# Patient Record
Sex: FEMALE | Race: WHITE | Employment: UNEMPLOYED | URBAN - METROPOLITAN AREA
[De-identification: names, ages, dates, MRNs, and addresses within clinical notes are randomized per-mention and may not be internally consistent; named-entity substitution may affect disease eponyms.]

---

## 2018-07-26 ENCOUNTER — LAB REQUISITION (OUTPATIENT)
Dept: LAB | Facility: HOSPITAL | Age: 33
End: 2018-07-26
Payer: COMMERCIAL

## 2018-07-26 DIAGNOSIS — Z34.83 ENCOUNTER FOR SUPERVISION OF OTHER NORMAL PREGNANCY, THIRD TRIMESTER: ICD-10-CM

## 2018-07-26 PROCEDURE — 87653 STREP B DNA AMP PROBE: CPT | Performed by: OBSTETRICS & GYNECOLOGY

## 2018-07-28 LAB — GP B STREP DNA SPEC QL NAA+PROBE: NORMAL

## 2018-07-31 ENCOUNTER — HOSPITAL ENCOUNTER (OUTPATIENT)
Facility: HOSPITAL | Age: 33
Discharge: HOME | End: 2018-07-31
Attending: OBSTETRICS & GYNECOLOGY | Admitting: OBSTETRICS & GYNECOLOGY
Payer: COMMERCIAL

## 2018-07-31 VITALS — TEMPERATURE: 97.8 F | RESPIRATION RATE: 16 BRPM

## 2018-07-31 PROBLEM — IMO0001 CEPHALIC VERSION: Status: ACTIVE | Noted: 2018-07-31

## 2018-07-31 PROCEDURE — 99203 OFFICE O/P NEW LOW 30 MIN: CPT

## 2018-07-31 RX ORDER — TERBUTALINE SULFATE 1 MG/ML
0.25 INJECTION, SOLUTION SUBCUTANEOUS ONCE
Status: COMPLETED | OUTPATIENT
Start: 2018-07-31 | End: 2018-07-31

## 2018-07-31 RX ADMIN — TERBUTALINE SULFATE 0.25 MG: 1 INJECTION SUBCUTANEOUS at 19:08

## 2018-07-31 NOTE — PROCEDURES
ewa Infante at 37w4d with an YORDAN of 8/17/2018, by Patient Reported, was seen at [facility] for the following procedure(s):       External Cephalic version    Patient was consented for an external cephalic version  IV access was obtained  Ultrasound done at bedside confirmed breech presentation with adequate fluid pockets  NST was Cat 1  Anesthesia and NICU were aware of the plan  Assistant- Dr Hussain Gatica     External cephalic version was performed under ultrasound guidance  Successful version was done in approximately 2 minutes  Fetus was placed on external moniotring for approximately one hour after procedure  Fetal tracing Cat 1 with no decels or contractions  Patient was discharged to home with routine discharge instructions and was to follow up with OB as scheduled

## 2018-07-31 NOTE — H&P
H&P Exam - Obstetrics   Yoel Olivas 35 y o  female MRN: [id number]  Unit/Bed#: L&D 323-01 Encounter: [id number]      History of Present Illness     Chief Complaint: For external cephalic version    HPI:  oYel Olivas is a 35 y o  [place] female with an YORDAN of Not found  at Unknown weeks gestation who is being admitted for ***  Contractions: ***  Vaginal Bleeding:***  Loss of Fluid:***  Fetal Movement:***    PREGNANCY COMPLICATIONS:   None    OB History    Para Term  AB Living   2 1 1 0 0 1   SAB TAB Ectopic Multiple Live Births           1      # Outcome Date GA Lbr Jeff/2nd Weight Sex Delivery Anes PTL Lv   2 Current            1 Term 07/19/15 38w2d    Vag-Vacuum   AARON          Baby complications/comments:  None    Review of Systems    Historical Information   No past medical history on file  No past surgical history on file  Social History   History   Alcohol use Not on file     History   Drug use: Unknown     History   Smoking Status    Not on file   Smokeless Tobacco    Not on file     Family History: No family history on file  Meds/Allergies    {  No prescriptions prior to admission  Allergies not on file    OBJECTIVE:    Vitals: There were no vitals taken for this visit  There is no height or weight on file to calculate BMI  Physical Exam  SVE:      FHT:     TOCO:          Prenatal Labs:   Blood type: AB positive  Antigen Screen: negative  Rubella: Immune  HIV: Negative  RPR: NR  Hep B: negative  Diabetes Screen: 112  GBS: negative      Assessment/Plan     ASSESSMENT:  36 yo  with breech presentation for external cephalic version    PLAN:   1) eFHM and toco   2) Insert peripheral IV   3) Terbutaline 0 25 mcg once          This patient will be an INPATIENT  and I certify the anticipated length of stay is >2 Midnights        Oli Herbert MD  2018  5:32 PM

## 2018-08-19 ENCOUNTER — HOSPITAL ENCOUNTER (INPATIENT)
Facility: HOSPITAL | Age: 33
LOS: 1 days | Discharge: HOME | End: 2018-08-20
Attending: OBSTETRICS & GYNECOLOGY | Admitting: OBSTETRICS & GYNECOLOGY
Payer: COMMERCIAL

## 2018-08-19 PROBLEM — Z87.59 STATUS POST VACUUM-ASSISTED VAGINAL DELIVERY: Status: ACTIVE | Noted: 2018-08-19

## 2018-08-19 PROBLEM — Z3A.40 40 WEEKS GESTATION OF PREGNANCY: Status: ACTIVE | Noted: 2018-08-19

## 2018-08-19 LAB
BASE EXCESS BLDCOA CALC-SCNC: -8.5 MMOL/L (ref 3–11)
BASE EXCESS BLDCOV CALC-SCNC: -5.7 MMOL/L (ref 1–9)
HCO3 BLDCOA-SCNC: 22.1 MMOL/L (ref 17.3–27.3)
HCO3 BLDCOV-SCNC: 20.3 MMOL/L (ref 12.2–28.6)
O2 CT VFR BLDCOA CALC: 10.4 ML/DL
OXYHGB MFR BLDCOA: 41.9 %
OXYHGB MFR BLDCOV: 64.2 %
PCO2 BLDCOA: 66.4 MM[HG] (ref 30–60)
PCO2 BLDCOV: 41.7 MM HG (ref 27–43)
PH BLDCOA: 7.14 [PH] (ref 7.23–7.43)
PH BLDCOV: 7.31 [PH] (ref 7.19–7.49)
PO2 BLDCOA: 24.1 MM HG (ref 5–25)
PO2 BLDCOV: 28.6 MM HG (ref 15–45)
SAO2 % BLDCOV: 16.2 ML/DL

## 2018-08-19 PROCEDURE — 99201 HB OFFICE/OUTPATIENT VISIT NEW (BRIEF): CPT

## 2018-08-19 PROCEDURE — 4A1HXCZ MONITORING OF PRODUCTS OF CONCEPTION, CARDIAC RATE, EXTERNAL APPROACH: ICD-10-PCS | Performed by: OBSTETRICS & GYNECOLOGY

## 2018-08-19 PROCEDURE — 10907ZC DRAINAGE OF AMNIOTIC FLUID, THERAPEUTIC FROM PRODUCTS OF CONCEPTION, VIA NATURAL OR ARTIFICIAL OPENING: ICD-10-PCS | Performed by: OBSTETRICS & GYNECOLOGY

## 2018-08-19 PROCEDURE — 82805 BLOOD GASES W/O2 SATURATION: CPT | Performed by: OBSTETRICS & GYNECOLOGY

## 2018-08-19 RX ORDER — SIMETHICONE 80 MG
80 TABLET,CHEWABLE ORAL 4 TIMES DAILY PRN
Status: DISCONTINUED | OUTPATIENT
Start: 2018-08-19 | End: 2018-08-20 | Stop reason: HOSPADM

## 2018-08-19 RX ORDER — ACETAMINOPHEN 325 MG/1
650 TABLET ORAL EVERY 4 HOURS PRN
Status: DISCONTINUED | OUTPATIENT
Start: 2018-08-19 | End: 2018-08-20 | Stop reason: HOSPADM

## 2018-08-19 RX ORDER — DIAPER,BRIEF,INFANT-TODD,DISP
1 EACH MISCELLANEOUS AS NEEDED
Status: DISCONTINUED | OUTPATIENT
Start: 2018-08-19 | End: 2018-08-20 | Stop reason: HOSPADM

## 2018-08-19 RX ORDER — IBUPROFEN 600 MG/1
600 TABLET ORAL EVERY 6 HOURS PRN
Status: DISCONTINUED | OUTPATIENT
Start: 2018-08-19 | End: 2018-08-20 | Stop reason: HOSPADM

## 2018-08-19 RX ORDER — OXYTOCIN/RINGER'S LACTATE 30/500 ML
250 PLASTIC BAG, INJECTION (ML) INTRAVENOUS CONTINUOUS
Status: ACTIVE | OUTPATIENT
Start: 2018-08-19 | End: 2018-08-19

## 2018-08-19 RX ORDER — ONDANSETRON 2 MG/ML
4 INJECTION INTRAMUSCULAR; INTRAVENOUS EVERY 8 HOURS PRN
Status: DISCONTINUED | OUTPATIENT
Start: 2018-08-19 | End: 2018-08-20 | Stop reason: HOSPADM

## 2018-08-19 RX ORDER — DOCUSATE SODIUM 100 MG/1
100 CAPSULE, LIQUID FILLED ORAL 2 TIMES DAILY
Status: DISCONTINUED | OUTPATIENT
Start: 2018-08-19 | End: 2018-08-20 | Stop reason: HOSPADM

## 2018-08-19 RX ORDER — OXYCODONE HYDROCHLORIDE AND ACETAMINOPHEN 5; 325 MG/1; MG/1
2 TABLET ORAL EVERY 4 HOURS PRN
Status: DISCONTINUED | OUTPATIENT
Start: 2018-08-19 | End: 2018-08-20 | Stop reason: HOSPADM

## 2018-08-19 RX ORDER — OXYTOCIN 10 [USP'U]/ML
INJECTION, SOLUTION INTRAMUSCULAR; INTRAVENOUS
Status: DISPENSED
Start: 2018-08-19 | End: 2018-08-19

## 2018-08-19 RX ORDER — OXYCODONE HYDROCHLORIDE AND ACETAMINOPHEN 5; 325 MG/1; MG/1
1 TABLET ORAL EVERY 4 HOURS PRN
Status: DISCONTINUED | OUTPATIENT
Start: 2018-08-19 | End: 2018-08-20 | Stop reason: HOSPADM

## 2018-08-19 RX ORDER — DIPHENHYDRAMINE HCL 25 MG
25 TABLET ORAL EVERY 6 HOURS PRN
Status: DISCONTINUED | OUTPATIENT
Start: 2018-08-19 | End: 2018-08-20 | Stop reason: HOSPADM

## 2018-08-19 RX ADMIN — BENZOCAINE AND LEVOMENTHOL 1 APPLICATION: 200; 5 SPRAY TOPICAL at 04:17

## 2018-08-19 RX ADMIN — IBUPROFEN 600 MG: 600 TABLET, FILM COATED ORAL at 17:38

## 2018-08-19 RX ADMIN — DOCUSATE SODIUM 100 MG: 100 CAPSULE, LIQUID FILLED ORAL at 09:36

## 2018-08-19 RX ADMIN — IBUPROFEN 600 MG: 600 TABLET, FILM COATED ORAL at 04:45

## 2018-08-19 RX ADMIN — WITCH HAZEL 1 PAD: 500 SOLUTION RECTAL; TOPICAL at 04:17

## 2018-08-19 NOTE — DISCHARGE SUMMARY
Discharge Summary - Francisco J Jiménez 35 y o  female MRN: [id number]    Unit/Bed#: L&D 418-09 Encounter: [id number]    Admission Date: 2018     Discharge Date: 18    Attending: Gabriela Vale DO    Principal Diagnosis: Encounter for full-term uncomplicated delivery [S03], Pregnancy at 40w2d    Secondary Diagnosis: non-reassuring fetal status    Procedures: vacuum, low    Anesthesia: none    Complications: none apparent    Condition at discharge: stable     History of Hospital Course:  Corin Montoya was a 34 yo  at 36 2/7 wksWho presented to Labor and delivery in active labor  Upon arrival to the unit she was checked and noted to be complete/+1  She underwent amniotomy for meconium fluid and started pushing  There was good fetal descent with pushing however the baby started having late decels with slow return from the [de-identified]  Patient was given the option to continue pushing for a short amount of time however she could not accomplish delivery during that window  Vacuum assisted delivery was recommended  Risks and benefits were discussed  She was agreeable and underwent vacuum assisted vaginal delivery for a viable male  She had only a small left labial laceration which was hemostatic without repair  Her postpartum course was unremarkable  She was ambulating well tolerating a full diet voiding without difficulty and bonding well with her baby prior to discharge  She was discharged to home  Discharge instructions/Information to patient and family:   See after visit summary for information provided to patient and family  Provisions for Follow-Up Care:  See after visit summary for information related to follow-up care and any pertinent home health orders  Disposition: See After Visit Summary for discharge disposition information  Planned Readmission: No    Discharge Medications: For a complete list of the patient's medications, please refer to her med rec

## 2018-08-19 NOTE — H&P
H&P Exam - Obstetrics   Fadumo Patel 35 y o  female MRN: [id number]  Unit/Bed#: L&D 326-01 Encounter: [id number]    Assessment/Plan     Assessment:  30yo  at 40 2wks s/p vacuum assisted vaginal delivery  Currently in stable condition  Hospital day 1  Plan:  Admit for postpartum care  Routine perineal and postpartum care  Labs: CBC  Monitor lochia  Anticipate discharge on PPD #2  History of Present Illness   Chief Complaint: Active labor    HPI:  Fadumo Patel is a 35 y o  , now  female with an YORDAN of 2018, by Patient Reported at 40w2d weeks gestation who is being admitted for Active labor  Patient's prenatal course was complicated by malposition of fetus  Patient underwent an external cephalic version on   Patient reported contractions that started at approximately 2200  She denied LOF and vaginal bleeding  She then presented to L&D completely dilated and with urge to push  Patient's membranes were artificially ruptured  A vacuum assisted vaginal delivery was performed due to suspicion of fetal compromise  She successfully delivered a viable male  at 02:21  Pregnancy complications: none  Review of Systems   Constitutional: Negative  Negative for chills, diaphoresis and fever  HENT: Negative  Eyes: Negative for visual disturbance  Respiratory: Negative  Negative for chest tightness, shortness of breath and wheezing  Cardiovascular: Negative  Negative for chest pain and palpitations  Gastrointestinal: Positive for abdominal pain  Negative for constipation, nausea and vomiting  Genitourinary: Positive for pelvic pain  Negative for difficulty urinating, dysuria, vaginal bleeding, vaginal discharge and vaginal pain  Musculoskeletal: Negative  Skin: Negative  Neurological: Negative  Negative for dizziness, seizures, weakness and headaches  Hematological: Negative  Psychiatric/Behavioral: Negative          Historical Information   OB History    Para Term  AB Living   2 1 1 0 0 1   SAB TAB Ectopic Multiple Live Births           1      # Outcome Date GA Lbr Jeff/2nd Weight Sex Delivery Anes PTL Lv   2 Current            1 Term 07/19/15 38w2d    Vag-Vacuum   AARON        Baby complications/comments: none  No past medical history on file  No past surgical history on file  Social History   History   Alcohol use Not on file     History   Drug use: Unknown     History   Smoking Status    Not on file   Smokeless Tobacco    Not on file     Family History: No family history on file  Meds/Allergies   all medications and allergies reviewed  Allergies   Allergen Reactions    No Active Allergies        Objective   Vitals: Blood pressure 116/82, pulse 86, temperature 98 °F (36 7 °C), temperature source Oral, resp  rate 16  There is no height or weight on file to calculate BMI  Invasive Devices          No matching active lines, drains, or airways          Physical Exam   Constitutional: She is oriented to person, place, and time  She appears well-developed and well-nourished  No distress  HENT:   Head: Normocephalic and atraumatic  Cardiovascular: Normal rate and intact distal pulses  Pulmonary/Chest: Effort normal and breath sounds normal  No respiratory distress  She exhibits no tenderness  Abdominal: Soft  There is no tenderness  There is no rebound and no guarding  Genitourinary: Vagina normal  No vaginal discharge found  Genitourinary Comments: SVE 10/100/+1, fetus in vertex position  Musculoskeletal: Normal range of motion  Neurological: She is alert and oriented to person, place, and time  Skin: Skin is warm and dry  She is not diaphoretic  Psychiatric: She has a normal mood and affect   Her behavior is normal  Judgment and thought content normal        Prenatal Labs:   Blood Type:   Lab Results   Component Value Date/Time    ABO Grouping AB 2015 11:00 PM     , D (Rh type):   Lab Results Component Value Date/Time    Rh Factor Positive 07/18/2015 11:00 PM     , Antibody Screen:   Lab Results   Component Value Date/Time    Antibody Screen Negative 07/18/2015 11:00 PM    , HCT/HGB:   Lab Results   Component Value Date/Time    Hematocrit 35 3 07/18/2015 11:00 PM    Hemoglobin 11 5 07/18/2015 11:00 PM      , Platelets:   Lab Results   Component Value Date/Time    Platelets 228 96/04/8468 11:00 PM      , 1 hour Glucola: No results found for: [id number], Rubella: No results found for: RUBELLAIGGQT     , VDRL/RPR:   Lab Results   Component Value Date/Time    RPR SCREEN Nonreactive 07/18/2015 11:00 PM      , Hep B: No results found for: HEPBSAG, EXTHEPBSAG  , HIV: No results found for: HIVAGAB  , Group B Strep:    Lab Results   Component Value Date/Time    Strep Grp B PCR Negative for Beta Hemolytic Strep Grp B by PCR 07/26/2018 05:27 PM          Imaging, EKG, Pathology, and Other Studies: I have personally reviewed pertinent reports  Diana Pool MD  OBGYN, [facility]  8/19/2018 3:25 AM

## 2018-08-19 NOTE — L&D DELIVERY NOTE
DELIVERY NOTE  Carla Danielson 35 y o  female MRN: [id number]  Unit/Bed#: L&D 315-01 Encounter: [id number]    Obstetrician: Cj Gill DO  Assistant: Alejandro Daavlos MD    Pre-Delivery Diagnosis: Term pregnancy  Single fetus  Pregnancy complicated by previous breech presentation resolved by ECV    Post-Delivery Diagnosis: Same as above - Delivered  Viable male fetus  Labial tear    Procedure: Low vacuum assisted vaginal delivery    Delivery Date and Time: 18 at [address]    Indications for instrumental delivery: Suspicion of immediate or potential fetal compromise    Estimated Blood Loss:  216LB           Complications:  None    Labor and Delivery  Carla Danielson arrived to &D in active labor  She was examined upon arrival to [facility] and her cervical exam was Complete and +1  She began pushing immediately after AROM for meconium stained fluid  During pushing, the baby had recurrent decels to the 80s for which a VAVD was recommended  Risks and benefits were discussed with the patient and she was agreeable  Kiwi vacuum was applied at 66 91 28 to [facility] position and removed at 0221  After pushing for 22 minutes under epidural anesthesia, with no vacuum popoffs, the patient delivered by VAVD  There was no nuchal cord  The anterior shoulder delivered atraumatically with maternal expulsive forces and the assistance of gentle downward traction  The posterior shoulder delivered with maternal expulsive forces and the assistance of gentle upward traction  The remainder of the fetus delivered without difficulty  She gave birth to a viable male  with Apgar scores of 8 at 1 minute and 9 at 5 minutes  The baby weighed 7lb  Magno Fuentes  Upon delivery, the  was placed on the mother's abdomen and the umbilical cord was eventually clamped and cut  The  resuscitator evaluated the  at bedside   Arterial and venous cord blood gases and cord blood were collected for analysis along with a segment of the umbilical cord  The placenta delivered spontaneously and was noted to have a 3-vessel cord  The uterus was massaged to firm and IV Pitocin was given  EBL: 300mL    The vagina, cervix, and perineum were inspected and there was noted to be a left labial laceration that was hemostatic without repair  At the conclusion of the delivery, all sponge, needle, and instrument counts were noted to be correct  The patient tolerated the procedure well and was allowed to recover in labor and delivery room  I was present for this delivery      Janae Reese DO  08/19/18  8:04 AM

## 2018-08-19 NOTE — NURSING NOTE
Dr Brooks Ladarius at bedside to evaluate pt  AROM @ 0200,Pt Pushing, Meconium noted, Neonatolgy called  FHR 80-90, Vacuum applied  VAVD at 0221  FFU  Moderate bleeding  Both Mother and baby stable

## 2018-08-20 VITALS
RESPIRATION RATE: 18 BRPM | DIASTOLIC BLOOD PRESSURE: 82 MMHG | HEART RATE: 72 BPM | OXYGEN SATURATION: 97 % | SYSTOLIC BLOOD PRESSURE: 113 MMHG | TEMPERATURE: 97.6 F

## 2018-08-20 RX ADMIN — DOCUSATE SODIUM 100 MG: 100 CAPSULE, LIQUID FILLED ORAL at 08:11

## 2018-08-20 NOTE — PROGRESS NOTES
Postpartum Progress Note - OB/GYN   Jt Stephens 35 y o  female MRN: [id number]  Unit/Bed#: L&D 315-01 Encounter: [id number]    Postpartum Day: #1    Procedure: Vacuum Vaginal Delivery    Subjective ROS  Pain: mild cramping, improved with medications  Tolerating Oral Intake: yes   Voiding: yes  Flatus: yes  Bowel Movement: no  Ambulating: yes  Breastfeeding: yes  Chest Pain: no  Shortness of Breath: no  Leg Pain/Discomfort: no  Lochia: moderate    Vitals: /86 (BP Location: Left arm)   Pulse 84   Temp 98 2 °F (36 8 °C) (Oral)   Resp 16       Intake/Output Summary (Last 24 hours) at 18 9927  Last data filed at 18 1422   Gross per 24 hour   Intake                0 ml   Output             1300 ml   Net            -1300 ml       Physical Exam  General: appears well, NAD, alert and oriented x 3, pleasant and cooperative  Cardiovascular: Regular, Rate and Rhythm, no murmur, gallop or rub  Lungs: Clear to Auscultation Bilaterally, no wheezing, rhonchi or rales   Abdomen: Soft, non-distended, non-tender, no rebound  or guarding  Fundus: Firm & Non-Tender   Fundal Location: At the umbilicus  Lower Extremities: Symmetric, nontender    Labs:   Admission on 2018   Component Date Value    pH, Cord Art 2018 7 140*    pCO2, Cord Art 2018 66 4*    pO2, Cord Art 2018 24 1     HCO3, Cord Art 2018 22 1     Base Exc, Cord Art 2018 -8 5*    O2 Content, Cord Art 2018 10 4     O2 Hgb, Arterial Cord 2018 41 9     pH, Cord Jaison 2018 7  306     pCO2, Cord Jaison 2018 41 7     pO2, Cord Jaison 2018 28 6     HCO3, Cord Jaison 2018 20 3    [facility] Exc, Cord Jaison 2018 -5 7*    O2 Cont, Cord Jaison 2018 16 2     O2 HGB,VENOUS CORD 2018 64 2        Assessment:  35 y o   s/p VAVD PPD#1, doing well postpartum       Plan:  Continue routine care  Pain medication as needed  Encourage breastfeeding  Encourage ambulation  Encourage PO intake  Anticipate discharge tomorrow, PPD#2    Karla Davis MD  8/20/2018  6:15 AM

## 2018-08-20 NOTE — DISCHARGE SUMMARY
Discharge Summary - Agnes Kelly 35 y o  female MRN: [id number]    Unit/Bed#: L&D 497-61 Encounter: [id number]    Admission Date: 8/19/2018     Discharge Date: 8/20/2018    Attending: Trupti Crawley DO    Principal Diagnosis: Encounter for full-term uncomplicated delivery [R76]    Secondary Diagnosis: S/P VAVD    Procedures: VAVD on 3/49/0130    Complications: none apparent    Hospital Course: She presented in active labor and underwent an uncomplicated VAVD secondary to non-reassuring fetal condition  She had an uncomplicated postpartum course  She was breastfeeding without difficulty  On day of discharge (PPD#1) pain was well controlled, patient was tolerating PO, passing flatus  She was discharged with standard post partum instructions to follow up with her physician in 3-6 weeks for a postpartum appointment  Condition at discharge: good     Discharge instructions/Information to patient and family:   See after visit summary for information provided to patient and family  Provisions for Follow-Up Care:  See after visit summary for information related to follow-up care and any pertinent home health orders  Disposition: See After Visit Summary for discharge disposition information  Planned Readmission: No    Discharge Medications: For a complete list of the patient's medications, please refer to her med rec

## 2018-08-31 ENCOUNTER — TELEPHONE (OUTPATIENT)
Dept: POSTPARTUM | Facility: CLINIC | Age: 33
End: 2018-08-31

## 2018-08-31 NOTE — TELEPHONE ENCOUNTER
Plugged Ducts During Lactation Octavio Fischer is calling because she is nursing and believes she has a plugged duct  She is also being treated for mastitis by her OB  She has had fever since this morning and has an area on breast that is tender and feels blocked  She is 2 weeks postpartum   Any fever? Yes  Any breast redness over the plugged area? No   Is there severe pain that necessitates prescription pain medication? No   If any answers are yes to the above, she needs to see her primary care provider or other medical provider ASAP  Has the plugged area been present for more than 48 hours? No   If yes, she needs an urgent visit to rule out infection, abscess or other type of mass  If she has a swollen painful region of the breast that is not draining, and it has been less than 48 hours, advise the following:   x Use heat on the breast for 5-10 minutes before nursing  This can be done with a heating pad, moist warm towels, or hand warmers  x Nurse the baby on that side more often, at least every 2 hours  IF not nursing, pump that side every 2 hours  Changing breastfeeding positions can also help work out a plug  x Try to massage the plugged area while nursing or pumping to help drain the area  x Use ibuprofen 400mg every 4 hours or acetaminophen 650mg every 4 hours as needed for discomfort  x If the area is not drained well by 48 hours, she should be seen by her primary care provider and ideally a lactation consultant  Take medications as prescribed by her OB  Call with any additional questions or concerns

## 2018-08-31 NOTE — TELEPHONE ENCOUNTER
Pt called - thinks has mastitis - pain in right breast on outside - not red yet - she also has a cold - spiked a fever - not sure if connected - said has a call out to [facility] doc which is Dr Sam Hedrick (hasn't heard back yet) - did not call her OB - please advise

## 2018-09-05 LAB — PLACENTA IN STORAGE: NORMAL

## 2022-03-16 ENCOUNTER — OFFICE VISIT (OUTPATIENT)
Dept: FAMILY MEDICINE CLINIC | Facility: CLINIC | Age: 37
End: 2022-03-16
Payer: COMMERCIAL

## 2022-03-16 VITALS
BODY MASS INDEX: 23.63 KG/M2 | HEART RATE: 104 BPM | DIASTOLIC BLOOD PRESSURE: 86 MMHG | HEIGHT: 66 IN | OXYGEN SATURATION: 100 % | WEIGHT: 147 LBS | SYSTOLIC BLOOD PRESSURE: 132 MMHG | TEMPERATURE: 98.6 F

## 2022-03-16 DIAGNOSIS — Z11.59 ENCOUNTER FOR HEPATITIS C SCREENING TEST FOR LOW RISK PATIENT: ICD-10-CM

## 2022-03-16 DIAGNOSIS — Z13.220 SCREENING FOR LIPID DISORDERS: ICD-10-CM

## 2022-03-16 DIAGNOSIS — E04.1 LEFT THYROID NODULE: ICD-10-CM

## 2022-03-16 DIAGNOSIS — Z11.4 SCREENING FOR HIV (HUMAN IMMUNODEFICIENCY VIRUS): ICD-10-CM

## 2022-03-16 DIAGNOSIS — E55.9 VITAMIN D DEFICIENCY: ICD-10-CM

## 2022-03-16 DIAGNOSIS — Z00.00 ANNUAL PHYSICAL EXAM: Primary | ICD-10-CM

## 2022-03-16 PROCEDURE — 1036F TOBACCO NON-USER: CPT | Performed by: FAMILY MEDICINE

## 2022-03-16 PROCEDURE — 99385 PREV VISIT NEW AGE 18-39: CPT | Performed by: FAMILY MEDICINE

## 2022-03-16 PROCEDURE — 3725F SCREEN DEPRESSION PERFORMED: CPT | Performed by: FAMILY MEDICINE

## 2022-03-16 PROCEDURE — 3008F BODY MASS INDEX DOCD: CPT | Performed by: FAMILY MEDICINE

## 2022-03-16 NOTE — PROGRESS NOTES
Patient Name:  Thony Hui   :  1985   MRN:  [id number]   CSN:  [id number]     Subjective:     REASON FOR VISIT:    Chief Complaint   Patient presents with    Establish Care        HISTORY OF PRESENT ILLNESS:     Antoinette Giordano is a 39 y o  female who presents today for annual physical      Date of last physical exam: n/a   Most recent bloodwork: n/a     Flu shot:  Patient received counseling today and was educated on importance of flu vaccine  Patient aware of risks of receiving vaccine such as allergy, GBS, and mild illness  Preventive: The patient's BMI is Body mass index is 23 55 kg/m²  Jose Rafael Knapp The BMI is in the acceptable range   Diet: Well rounded, home cooked    part-time, works from home    Exercise:  Pilates, yoga and cardio 5 days/week   Colonoscopy (>50, African [place] >[address] old): denies    Last Dental Visit: every 6 months, [facility]     OB/GYn: [facility]   Pregnancy History:    P: 2  Age 10and 1year old boys    Menstrual History   Mammography/breast ultrasound: denies   Denies family history of reproductive cancers   Last Pap: 2018  History of Abnormal Pap:denies     LMP:  22  Regular cycles  Sexually active: yes with    Uses STD/pregnancy protection: condoms   History of STD: denies     PAST MEDICAL HISTORY:   Past Medical History:   Diagnosis Date    Allergic     Anxiety         PAST SURGICAL HISTORY:   History reviewed  No pertinent surgical history       CURRENT MEDICATIONS:   Previous Medications    PRENATAL VIT-FE FUMARATE-FA (PRENATAL VITAMIN PO)    Take 1 tablet by mouth daily        SOCIAL HISTORY:   Social History     Tobacco Use    Smoking status: Never Smoker    Smokeless tobacco: Never Used   Substance Use Topics    Alcohol use: Yes     Comment: social        DEPRESSION SCREENING:   Depression Screening   PHQ-2/9 Depression Screening    Little interest or pleasure in doing things: 0 - not at all  Feeling down, depressed, or hopeless: 0 - not at all  PHQ-2 Score: 0  PHQ-2 Interpretation: Negative depression screen                                                                     FAMILY HISTORY:   Family History   Problem Relation Age of Onset    Thyroid cancer Mother     Diabetes Father     Asthma Brother     No Known Problems Maternal Grandmother     Heart disease Maternal Grandfather         ALLERGIES:   Allergies   Allergen Reactions    No Active Allergies         ROS:   Review of Systems   Constitutional: Negative for activity change, appetite change, chills, fatigue and fever  HENT: Negative for congestion, ear discharge, ear pain, postnasal drip, rhinorrhea, sinus pressure, sinus pain, sneezing, sore throat and trouble swallowing  Eyes: Negative for pain, discharge, redness, itching and visual disturbance  Respiratory: Negative for apnea, cough, chest tightness, shortness of breath and wheezing  Cardiovascular: Negative for chest pain, palpitations and leg swelling  Gastrointestinal: Negative for abdominal distention, abdominal pain, blood in stool, constipation, diarrhea, nausea and vomiting  Endocrine: Negative for polyuria  Genitourinary: Negative for decreased urine volume, difficulty urinating, dyspareunia, dysuria, flank pain, frequency, menstrual problem, pelvic pain, urgency, vaginal bleeding and vaginal discharge  Musculoskeletal: Negative for arthralgias, gait problem, joint swelling and myalgias  Skin: Negative for rash  Neurological: Negative for dizziness, weakness, light-headedness, numbness and headaches  Psychiatric/Behavioral: Negative for behavioral problems and dysphoric mood  The patient is not nervous/anxious  All other systems reviewed and are negative         Objective:     PHYSICAL EXAM:   /86 (BP Location: Left arm, Patient Position: Sitting, Cuff Size: Standard)   Pulse 104   Temp 98 6 °F (37 °C) (Tympanic)   Ht 5' 6 25" (1 683 m)   Wt 66 7 kg (147 lb)   LMP 02/27/2022 (Exact Date)   SpO2 100%   Breastfeeding Yes   BMI 23 55 kg/m²    No exam data present   Physical Exam  Vitals and nursing note reviewed  Constitutional:       General: She is not in acute distress  Appearance: Normal appearance  HENT:      Head: Normocephalic and atraumatic  Right Ear: Tympanic membrane, ear canal and external ear normal       Left Ear: Tympanic membrane, ear canal and external ear normal       Nose: Nose normal  No congestion  Mouth/Throat:      Mouth: Mucous membranes are moist       Pharynx: Oropharynx is clear  Eyes:      Extraocular Movements: Extraocular movements intact  Conjunctiva/sclera: Conjunctivae normal       Pupils: Pupils are equal, round, and reactive to light  Neck:      Thyroid: Thyroid mass present  Vascular: No carotid bruit  Cardiovascular:      Rate and Rhythm: Normal rate and regular rhythm  Heart sounds: No murmur heard  Pulmonary:      Effort: Pulmonary effort is normal  No respiratory distress  Breath sounds: Normal breath sounds  Abdominal:      General: Bowel sounds are normal  There is no distension  Palpations: Abdomen is soft  There is no mass  Musculoskeletal:         General: No swelling  Normal range of motion  Cervical back: Normal range of motion  Lymphadenopathy:      Cervical: No cervical adenopathy  Skin:     General: Skin is warm and dry  Neurological:      General: No focal deficit present  Mental Status: She is alert and oriented to person, place, and time  Psychiatric:         Mood and Affect: Mood normal          Behavior: Behavior normal          Thought Content:  Thought content normal           Assessment/Plan:   Problem List Items Addressed This Visit     None      Visit Diagnoses     Annual physical exam    -  Primary    Relevant Orders    CBC and differential    Comprehensive metabolic panel    Lipid Panel with Direct LDL reflex    TSH, 3rd generation Screening for HIV (human immunodeficiency virus)        Relevant Orders    HIV 1/2 Antigen/Antibody (4th Generation) w Reflex [facility]    Encounter for hepatitis C screening test for low risk patient        Relevant Orders    Hepatitis C antibody    Screening for lipid disorders        Relevant Orders    Lipid Panel with Direct LDL reflex    Left thyroid nodule        Relevant Orders    US thyroid    Vitamin D deficiency        Relevant Orders    Vitamin D 25 hydroxy             Patient Counseling:   · Exercise: Stressed the importance of regular exercise       150 minutes of cardiovascular exercise encouraged weekly  · Nutrition: Stressed importance of moderation in sodium/caffeine intake, saturated fat and cholesterol, caloric balance, sufficient intake of fresh fruits, vegetables, fiber     Leticia Melendrez DO

## 2022-03-17 ENCOUNTER — TELEPHONE (OUTPATIENT)
Dept: ADMINISTRATIVE | Facility: OTHER | Age: 37
End: 2022-03-17

## 2022-03-17 NOTE — LETTER
Procedure Request Form: Cervical Cancer Screening      Date Requested: 22  Patient: Carlota Martin  Patient : 1985   Referring Provider: Pat Vasquez, DO        Date of Procedure ______________________________       The above patient has informed us that they have completed their   most recent Cervical Cancer Screening at your facility  Please complete   this form and attach all corresponding procedure reports/results  Comments __last we have is  ________________________________________________________  ____________________________________________________________________  ____________________________________________________________________  ____________________________________________________________________    Facility Completing Procedure _________________________________________    Form Completed By (print name) _______________________________________      Signature __________________________________________________________      These reports are needed for  compliance  Please fax this completed form and a copy of the procedure report to our office located at [place] [address] as soon as possible to [phone] yocasta Horton: Phone [phone]    We thank you for your assistance in treating our mutual patient

## 2022-03-17 NOTE — LETTER
Vaccination Request Form: Tdap      Date Requested: 22  Patient: Amey Sandifer  Patient : 1985   Referring Provider: Verner Deems,        The above patient has informed us that they have had their   most recent Tdap administered at your facility  Please   complete this form and attach all corresponding documentation  Date of Vaccine(s) Given  ______________________________    Lot Number(s) _______________________________________    Manufacture(s) ______________________________________    Dose Amount (s) _____________________________________    Expiration Date(s) ____________________________________    Comments __________________________________________________________  ____________________________________________________________________  ____________________________________________________________________  ____________________________________________________________________    Administering Facility  ________________________________________________    Vaccine Administered By (print name) ___________________________________      Form Completed By (print name) _______________________________________      Signature ___________________________________________________________      These reports are needed for  compliance  Please fax this completed form and a copy of the Vaccine Document(s) to our office located at [place] [address] as soon as possible to [phone] yocasta Jeerz: Phone [phone]    We thank you for your assistance in treating our mutual patient

## 2022-03-17 NOTE — TELEPHONE ENCOUNTER
----- Message from Starr Mcneil [place] sent at 3/16/2022  3:25 PM EDT -----  Regarding: Care Gap Request  03/16/22 3:25 PM    Hello, our patient Charley Santoro has had Immunization(s) - TDAP and Pap Smear (HPV) aka Cervical Cancer Screening completed/performed  Please assist in updating the patient chart by making an External outreach to [facility] located in [facility]  The date of service unknown      Thank you,  Selma Milian MA  [facility]

## 2022-03-17 NOTE — TELEPHONE ENCOUNTER
Upon review of the In Basket request and the patient's chart, initial outreach has been made via fax, please see Contacts section for details        Pap and tdap x1     Thank you  Jessica Hidalgo

## 2022-03-17 NOTE — LETTER
Procedure Request Form: Cervical Cancer Screening      Date Requested: 22  Patient: Phil Jett  Patient : 1985   Referring Provider: Saray Longest, DO        Date of Procedure ______________________________       The above patient has informed us that they have completed their   most recent Cervical Cancer Screening at your facility  Please complete   this form and attach all corresponding procedure reports/results  Comments _last we have is -13  ________________________________________________________  ____________________________________________________________________  ____________________________________________________________________  ____________________________________________________________________    Facility Completing Procedure _________________________________________    Form Completed By (print name) _______________________________________      Signature __________________________________________________________      These reports are needed for  compliance  Please fax this completed form and a copy of the procedure report to our office located at [place] [address] as soon as possible to [phone] yocasta Soni Man: Phone [phone]    We thank you for your assistance in treating our mutual patient

## 2022-03-24 NOTE — TELEPHONE ENCOUNTER
As a follow-up, a second attempt has been made for outreach via fax, please see Contacts section for details       2nd att - pap     Thank you  Pasha Dyson

## 2022-03-25 ENCOUNTER — APPOINTMENT (OUTPATIENT)
Dept: LAB | Facility: AMBULARY SURGERY CENTER | Age: 37
End: 2022-03-25
Payer: COMMERCIAL

## 2022-03-28 ENCOUNTER — HOSPITAL ENCOUNTER (OUTPATIENT)
Dept: ULTRASOUND IMAGING | Facility: MEDICAL CENTER | Age: 37
Discharge: HOME | End: 2022-03-28
Payer: COMMERCIAL

## 2022-03-28 ENCOUNTER — TELEPHONE (OUTPATIENT)
Dept: FAMILY MEDICINE CLINIC | Facility: CLINIC | Age: 37
End: 2022-03-28

## 2022-03-28 ENCOUNTER — TELEPHONE (OUTPATIENT)
Dept: HEMATOLOGY ONCOLOGY | Facility: CLINIC | Age: 37
End: 2022-03-28

## 2022-03-28 DIAGNOSIS — D64.9 ANEMIA, UNSPECIFIED TYPE: Primary | ICD-10-CM

## 2022-03-28 DIAGNOSIS — E04.1 LEFT THYROID NODULE: ICD-10-CM

## 2022-03-28 PROCEDURE — 76536 US EXAM OF HEAD AND NECK: CPT

## 2022-03-28 RX ORDER — FERROUS SULFATE TAB EC 324 MG (65 MG FE EQUIVALENT) 324 (65 FE) MG
324 TABLET DELAYED RESPONSE ORAL
Qty: 180 TABLET | Refills: 1 | Status: SHIPPED | OUTPATIENT
Start: 2022-03-28 | End: 2022-04-15 | Stop reason: CLARIF

## 2022-03-28 NOTE — TELEPHONE ENCOUNTER
Upon review of the In Basket request we were able to locate, review, and update the patient chart as requested for Pap Smear (HPV) aka Cervical Cancer Screening  Any additional questions or concerns should be emailed to the Practice Liaisons via [email or web address] email, please do not reply via In Basket      Thank you  Markie Wilson

## 2022-03-28 NOTE — TELEPHONE ENCOUNTER
Lab results were discussed with patient  Patient with anemia, Hgb 7 9 on recent CBC  She denies known history of anemia  Last blood work from 2016 shows normal CBC  She denies symptoms of dizziness, chest pain, fatigue, and lightheadedness  Patient reports that she feels well  She has normal menstrual cycles and denies heavy vaginal bleeding during periods  Denies blood in stool  All other ROS negative  We discussed the importance of further testing and follow up regarding new anemia  She was referred to hematology and given phone number to call and schedule appointment  She should start iron supplement BID which was sent to pharmacy  Patient was instructed to go to the ED if she experiences dizziness, chest pain, lightheadedness, weakness or fatigue  She verbalized understanding

## 2022-03-28 NOTE — TELEPHONE ENCOUNTER
New Patient Intake Form   Patient Details:    Gabriel Huff  1985    Appointment Information   Who is calling to schedule? Patient   If not self, what is the caller's name? Please put name of [facility] nurse as well  Referring provider Shanika Sewell, DO   What is the diagnosis? Anemia, unspecified type     Is there a confirmed tissue diagnosis? No   Is there a biopsy ordered or pending? Please specify dates   n/a     Is patient aware of diagnosis? Yes   Have you had any imaging or labs done? If yes, where? (If imaging done outside of [facility], please remind patient to bring a disk ) Yes     03/25     If imaging done at outside facility, did you instruct patient to obtain discs and bring to visit? n/a   Have you been seen by another Oncologist/Hematologist?  If so, who and where? no   Are the records in [facility] or Care Everywhere? yes   Does the patient have records at another facility/hospital? no   If yes, Name of facility, city and state where facility is located  Did you instruct patient to have records faxed to [facility] and provide rightfax number? n/a   Preferred [place]   Is the patient willing to be seen by another provider? (This is for breast patients only) n/a     Did you send new patient paperwork?   Email or mail? no   Miscellaneous Information: appt made for 04/15

## 2022-04-11 NOTE — PROGRESS NOTES
[facility] - Hematology & Medical Oncology  Outpatient Visit Encounter Note      Hailey Boss 39 y o  female 1985 [id number] Date:  4/15/2022    HEMATOLOGICAL HISTORY        Clotting History Denies   Bleeding History Denies   Cancer History Denies   Family Cancer History Mother with thyroid cancer   H/O COVID Infection Denies   H/O COVID Vaccination 2/2 Sacha Pollard   H/O Blood/Plt Transfusion Denies   Tobacco Use Denies   Alcohol Use Occasionally   Occupation Part time sign language interpretor       SUBJECTIVE      Hailey Boss is a 39 y o  here for new consultation with me today  The patient is referred by PCP Dr Nusrat Storm and the reason for consultation is anemia  Her CBC shows significant microcytic anemia:   7/18/2015  3/25/2022    WBC 9 93 4 70   Hemoglobin 11 5 7 9 (L)   HCT 35 3 29 0 (L)   MCV 86 60 (L)   Platelet Count 347 073     Her differential was unremarkable:   3/25/2022    Immature Grans Absolute 0 01   Absolute Neutrophils 2 33   Lymphocytes Absolute 1 64   Absolute Monocytes 0 55   Absolute Eosinophils 0 11   Basophils Absolute 0 06       This Visit  She is here by herself today  She voices no acute complaints  She says she has regular menstrual periods  She says that she has had heavier menstrual bleeding after giving birth 3 years ago  She says that she uses cloth pads which she changes whenever she goes to the bathroom  She notes that she sees clots as well  She bleeds 5 days in total     She notes baseline fatigue which she attributes to being a mom  She notes lightheadedness and dizziness with standing up quickly  Denies insomnia, pagophagia, RLS, brittle nails, or thinning hair    She denies fevers, chills, unintentional weight loss, night sweats, headaches, cough, SOB, palpitations, chest pain, abdominal tenderness/distension, nausea/vomiting, constipation/diarrhea, melena/hematochezia, hematuria, petechiae/purpura, increased ecchymosis, or LE swelling  I have reviewed the relevant past medical, surgical, social and family history  I have also reviewed allergies and medications for this patient  Review of Systems  Review of Systems   All other systems reviewed and are negative  OBJECTIVE     Physical Exam  Vitals:    04/15/22 1355   BP: 130/72   BP Location: Left arm   Patient Position: Sitting   Cuff Size: Adult   Pulse: (!) 106   Resp: 16   Temp: 98 1 °F (36 7 °C)   TempSrc: Temporal   SpO2: 100%   Weight: 62 6 kg (138 lb)   Height: 5' 6 5" (1 689 m)       Physical Exam  Vitals reviewed  Constitutional:       General: She is not in acute distress  Appearance: Normal appearance  She is normal weight  She is not ill-appearing, toxic-appearing or diaphoretic  Comments: Pleasant 39 y o  female sitting comfortably on an exam room chair  HENT:      Head: Normocephalic and atraumatic  Eyes:      General: No scleral icterus  Extraocular Movements: Extraocular movements intact  Conjunctiva/sclera: Conjunctivae normal       Pupils: Pupils are equal, round, and reactive to light  Comments: No conjunctival pallor  Cardiovascular:      Rate and Rhythm: Regular rhythm  Tachycardia present  Pulses: Normal pulses  Heart sounds: Normal heart sounds  No murmur heard  No friction rub  No gallop  Pulmonary:      Effort: Pulmonary effort is normal  No respiratory distress  Breath sounds: Normal breath sounds  No wheezing or rales  Abdominal:      General: Bowel sounds are normal  There is no distension  Palpations: Abdomen is soft  There is no mass  Tenderness: There is no abdominal tenderness  There is no guarding or rebound  Musculoskeletal:         General: No swelling or tenderness  Normal range of motion  Cervical back: Normal range of motion and neck supple  No rigidity  Right lower leg: No edema  Left lower leg: No edema     Lymphadenopathy:      Cervical: No cervical adenopathy  Skin:     General: Skin is warm and dry  Coloration: Skin is not jaundiced or pale  Findings: No bruising, erythema or rash  Neurological:      General: No focal deficit present  Mental Status: She is alert  Mental status is at baseline  Psychiatric:         Mood and Affect: Mood normal          Behavior: Behavior normal           Imaging  Relevant imaging reviewed in chart    Labs  Relevant labs reviewed in chart     ASSESSMENT & PLAN      Diagnosis ICD-10-CM Associated Orders   1  Anemia, unspecified type  D64 9 Ambulatory Referral to Hematology / Oncology     Iron Panel (Includes Ferritin, Iron Sat%, Iron, and TIBC)     Vitamin B12     Folate       I discussed Irwin Sadler's case with Dr Ravinder Arriaza and we formulated the plan together   39 y o  Female seen in consultation for microcytic anemia  Discussion   I extensively reviewed her blood work with her, which shows significant microcytic anemia, concerning for severe iron deficiency anemia   However, given that she has no recent nutritional blood work, I recommend comprehensive nutritional blood work after the visit today with plan to supplement as needed  Plan/Labs   Iron panel, vitamin B12, and folate    Follow Up   1 week      All questions were answered to the patient's satisfaction during this encounter  They appreciated and thanked me for spending time with them  The patient knows the contact information for our office and know to reach out for any relevant concerns related to this encounter  For all other listed problems and medical diagnosis in his chart - they are managed by PCP and/or other specialists, which patient acknowledges        Rodney Penaloza PA-C  Hematology & Medical Oncology

## 2022-04-13 ENCOUNTER — TELEPHONE (OUTPATIENT)
Dept: HEMATOLOGY ONCOLOGY | Facility: CLINIC | Age: 37
End: 2022-04-13

## 2022-04-15 ENCOUNTER — CONSULT (OUTPATIENT)
Dept: HEMATOLOGY ONCOLOGY | Facility: CLINIC | Age: 37
End: 2022-04-15
Payer: COMMERCIAL

## 2022-04-15 VITALS
HEIGHT: 67 IN | TEMPERATURE: 98.1 F | SYSTOLIC BLOOD PRESSURE: 130 MMHG | DIASTOLIC BLOOD PRESSURE: 72 MMHG | RESPIRATION RATE: 16 BRPM | OXYGEN SATURATION: 100 % | BODY MASS INDEX: 21.66 KG/M2 | HEART RATE: 106 BPM | WEIGHT: 138 LBS

## 2022-04-15 DIAGNOSIS — D64.9 ANEMIA, UNSPECIFIED TYPE: ICD-10-CM

## 2022-04-15 PROCEDURE — 99204 OFFICE O/P NEW MOD 45 MIN: CPT | Performed by: STUDENT IN AN ORGANIZED HEALTH CARE EDUCATION/TRAINING PROGRAM

## 2022-04-15 RX ORDER — FERROUS GLUCONATE 324(37.5)
324 TABLET ORAL
COMMUNITY
Start: 2022-03-28 | End: 2022-05-20

## 2022-04-16 ENCOUNTER — APPOINTMENT (OUTPATIENT)
Dept: LAB | Facility: CLINIC | Age: 37
End: 2022-04-16
Payer: COMMERCIAL

## 2022-04-18 DIAGNOSIS — R71.8 MICROCYTOSIS: Primary | ICD-10-CM

## 2022-04-19 ENCOUNTER — TELEPHONE (OUTPATIENT)
Dept: HEMATOLOGY ONCOLOGY | Facility: CLINIC | Age: 37
End: 2022-04-19

## 2022-04-19 DIAGNOSIS — E83.19 IRON OVERLOAD: Primary | ICD-10-CM

## 2022-04-19 NOTE — PROGRESS NOTES
[facility] - Hematology & Medical Oncology  Outpatient Visit Encounter Note      Jefferson Meigs 39 y o  female 1985 [id number] Date:  4/22/2022    HEMATOLOGICAL HISTORY        Clotting History Denies   Bleeding History Denies   Cancer History Denies   Family Cancer History Mother with thyroid cancer   H/O COVID Infection Denies   H/O COVID Vaccination 2/2 Nhi Smith   H/O Blood/Plt Transfusion Denies   Tobacco Use Denies   Alcohol Use Occasionally   Occupation Part time sign language interpretor       SUBJECTIVE      Jefferson Meigs is a 39 y o  here for new consultation with me today  The patient is referred by PCP Dr Williams Hernandez and the reason for consultation is anemia  Her CBC shows significant microcytic anemia:   7/18/2015  3/25/2022    WBC 9 93 4 70   Hemoglobin 11 5 7 9 (L)   HCT 35 3 29 0 (L)   MCV 86 60 (L)   Platelet Count 088 498     Her differential was unremarkable:   3/25/2022    Immature Grans Absolute 0 01   Absolute Neutrophils 2 33   Lymphocytes Absolute 1 64   Absolute Monocytes 0 55   Absolute Eosinophils 0 11   Basophils Absolute 0 06     She says she has regular menstrual periods  She says that she has had heavier menstrual bleeding after giving birth 3 years ago  She says that she uses cloth pads which she changes whenever she goes to the bathroom  She notes that she sees clots as well  She bleeds 5 days in total     She notes baseline fatigue which she attributes to being a mom  She notes lightheadedness and dizziness with standing up quickly  Denies insomnia, pagophagia, RLS, brittle nails, or thinning hair  This Visit    Her iron panel showed mixed picture of iron deficiency and iron overload:   4/16/2022    Iron 315 (H)   Ferritin 20   Iron Saturation 57 (H)   TIBC 554 (H)     Thus,  I ordered hereditary hemochromatosis work-up      She has adequate folate and vitamin B12 levels:   4/16/2022    Folate >20 0 (H)   Vitamin B-12 661     She is here by herself today  She voices no acute complaints  She notes baseline fatigue and cold intolerance  She also notes paresthesia of her bilateral pinky fingers when she held her oral iron  She notes resolution when she restarted oral iron  She also notes lightheadedness with standing up quickly  She denies fevers, chills, unintentional weight loss, night sweats, headaches, cough, SOB, palpitations, chest pain, abdominal tenderness/distension, nausea/vomiting, constipation/diarrhea, melena/hematochezia, hematuria, petechiae/purpura, increased ecchymosis, or LE swelling  I have reviewed the relevant past medical, surgical, social and family history  I have also reviewed allergies and medications for this patient  Review of Systems  Review of Systems   All other systems reviewed and are negative  OBJECTIVE     Physical Exam  Vitals:    04/22/22 1110   BP: 110/60   BP Location: Left arm   Patient Position: Sitting   Cuff Size: Adult   Pulse: 82   Resp: 16   Temp: 98 5 °F (36 9 °C)   TempSrc: Temporal   SpO2: 99%   Weight: 64 9 kg (143 lb)   Height: 5' 6 5" (1 689 m)       Physical Exam  Vitals reviewed  Constitutional:       General: She is not in acute distress  Appearance: Normal appearance  She is normal weight  She is not ill-appearing, toxic-appearing or diaphoretic  Comments: Pleasant 39 y o  female sitting comfortably on an exam room chair  HENT:      Head: Normocephalic and atraumatic  Eyes:      General: No scleral icterus  Extraocular Movements: Extraocular movements intact  Conjunctiva/sclera: Conjunctivae normal       Pupils: Pupils are equal, round, and reactive to light  Comments: No conjunctival pallor  Cardiovascular:      Rate and Rhythm: Normal rate and regular rhythm  Pulses: Normal pulses  Heart sounds: Normal heart sounds  No murmur heard  No friction rub  No gallop      Pulmonary:      Effort: Pulmonary effort is normal  No respiratory distress  Breath sounds: Normal breath sounds  No wheezing or rales  Abdominal:      General: Bowel sounds are normal  There is no distension  Palpations: Abdomen is soft  There is no mass  Tenderness: There is no abdominal tenderness  There is no guarding or rebound  Musculoskeletal:         General: No swelling or tenderness  Normal range of motion  Cervical back: Normal range of motion and neck supple  No rigidity  Right lower leg: No edema  Left lower leg: No edema  Lymphadenopathy:      Cervical: No cervical adenopathy  Skin:     General: Skin is warm and dry  Coloration: Skin is not jaundiced or pale  Findings: No bruising, erythema or rash  Neurological:      General: No focal deficit present  Mental Status: She is alert  Mental status is at baseline  Psychiatric:         Mood and Affect: Mood normal          Behavior: Behavior normal           Imaging  Relevant imaging reviewed in chart    Labs  Relevant labs reviewed in chart     ASSESSMENT & PLAN      Diagnosis ICD-10-CM Associated Orders   1  Iron deficiency anemia, unspecified iron deficiency anemia type  D50 9 Occult Blood, Fecal Immunochemical     CBC and Platelet     Iron Panel (Includes Ferritin, Iron Sat%, Iron, and TIBC)       I discussed Court Juan Sadler's case with Dr Katelynn Munguia and we formulated the plan together   39 y o  Female seen in follow-up for iron deficiency anemia  Discussion   I extensively reviewed her blood work with her, which shows significant microcytic anemia, concerning for severe iron deficiency anemia  Her iron panel showed mixed picture of iron deficiency (with low ferritin and elevated TIBC) and iron overload (high serum iron and iron saturation)  After discussion with Dr Katelynn Munguia, we believe she has true iron deficiency  However, I ordered blood work assessing for hereditary hemochromatosis for further work-up of her increased iron saturation   At this point, given her significant anemia, the benefits of IV iron supplementation outweigh the risks  Given Meriam Dubin Moyer's iron deficiency anemia, I recommend IV iron supplementation with IV Venofer  I reviewed the reasoning, process, and side effect profile of Venofer, which includes but is not limited to nausea, headache, hypotension, tattooing of the skin, and an anaphylactic reaction  The underlying etiology of Meriam Dubin Moyer's iron deficiency anemia is still unknown  However, I defer to her PCP to investigate the underlying cause and coordinate the appropriate management  I explained that IV iron supplementation will only temporarily alleviate her iron deficiency anemia unless the underlying etiology is managed  Thus, I strongly encouraged her to follow up with her PCP  Also, given her significant microcytosis, I ordered blood work to assess for alpha and beta thalassemia as she has two biological children  Plan/Labs   Follow-up with gynecology   FOBT to assess for fecal occult blood loss   Hereditary hemochromatosis and blood work assessing for beta and alpha thalassemia ordered   I ordered IV iron sucrose (Venofer) 300mg weekly x 5     I have ordered a CBC and platelet and iron panel in 3 months to confirm adequate iron supplementation and resolution of anemia  Follow Up   3 months      All questions were answered to the patient's satisfaction during this encounter  They appreciated and thanked me for spending time with them  The patient knows the contact information for our office and know to reach out for any relevant concerns related to this encounter  For all other listed problems and medical diagnosis in his chart - they are managed by PCP and/or other specialists, which patient acknowledges        Sarah Salamanca PA-C  Hematology & Medical Oncology

## 2022-04-19 NOTE — TELEPHONE ENCOUNTER
I called Taty Weldon to discuss her blood work results with her, which showed a mixed picture of iron deficiency and iron overload  I reviewed that given her significant microcytic anemia, low ferritin, and increased TIBC, she is iron deficient and I continue to recommend that she take oral iron supplementation until she sees me in the office on Friday for follow-up  Still, her increased iron saturation and serum iron does appear suspicious for iron overload  Given her concern for hereditary hemochromatosis, I ordered a hereditary hemochromatosis mutation panel for her to have drawn prior to the appointment  I answered all her questions and she voiced understanding

## 2022-04-22 ENCOUNTER — TELEPHONE (OUTPATIENT)
Dept: HEMATOLOGY ONCOLOGY | Facility: CLINIC | Age: 37
End: 2022-04-22

## 2022-04-22 ENCOUNTER — OFFICE VISIT (OUTPATIENT)
Dept: HEMATOLOGY ONCOLOGY | Facility: CLINIC | Age: 37
End: 2022-04-22
Payer: COMMERCIAL

## 2022-04-22 VITALS
HEIGHT: 67 IN | OXYGEN SATURATION: 99 % | SYSTOLIC BLOOD PRESSURE: 110 MMHG | RESPIRATION RATE: 16 BRPM | HEART RATE: 82 BPM | TEMPERATURE: 98.5 F | DIASTOLIC BLOOD PRESSURE: 60 MMHG | WEIGHT: 143 LBS | BODY MASS INDEX: 22.44 KG/M2

## 2022-04-22 DIAGNOSIS — D50.9 IRON DEFICIENCY ANEMIA, UNSPECIFIED IRON DEFICIENCY ANEMIA TYPE: Primary | ICD-10-CM

## 2022-04-22 PROCEDURE — 1036F TOBACCO NON-USER: CPT | Performed by: STUDENT IN AN ORGANIZED HEALTH CARE EDUCATION/TRAINING PROGRAM

## 2022-04-22 PROCEDURE — 3008F BODY MASS INDEX DOCD: CPT | Performed by: STUDENT IN AN ORGANIZED HEALTH CARE EDUCATION/TRAINING PROGRAM

## 2022-04-22 PROCEDURE — 99214 OFFICE O/P EST MOD 30 MIN: CPT | Performed by: STUDENT IN AN ORGANIZED HEALTH CARE EDUCATION/TRAINING PROGRAM

## 2022-04-22 RX ORDER — SODIUM CHLORIDE 9 MG/ML
20 INJECTION, SOLUTION INTRAVENOUS ONCE
Status: CANCELLED | OUTPATIENT
Start: 2022-05-06

## 2022-04-22 RX ORDER — SODIUM CHLORIDE 9 MG/ML
20 INJECTION, SOLUTION INTRAVENOUS ONCE
Status: CANCELLED | OUTPATIENT
Start: 2022-04-22

## 2022-04-22 NOTE — TELEPHONE ENCOUNTER
Appointment Cancellation Or Reschedule     Person calling in Patient    Provider Cite Jesse Dorsey   Office Visit Date and Time 4/29/22   Office Visit Location [place]   Did patient want to reschedule their office appointment? If so, when was it scheduled to? N/a   Is this patient calling to reschedule an infusion appointment? Yes   When is their next infusion appointment? 4/29/22 (wants to reschedule this appointment)    Is this patient a Chemo patient? No    Reason for Cancellation or Reschedule Wants to know if they can cancel the appointment on 4/29/22 and add another infusion appointment at the end      If the patient is a treatment patient, please route this to the office nurse  If the patient is not on treatment, please route to the office MA

## 2022-04-22 NOTE — TELEPHONE ENCOUNTER
Left detailed message for patient regarding iron treatment appts  Advised patient to call back if she has any questions  Pt [facility] also [facility]

## 2022-04-22 NOTE — TELEPHONE ENCOUNTER
While we try to accommodate patient requests, our priority is to schedule treatment according to Doctor's orders and site availability  1  Does the Provider use the intake sheet or checkout note? Checkout Note  2  What would be a preferred day of the week that would work best for your infusion appointment? Friday  3  Do you prefer mornings or afternoons for your appointments? Afternoon  4  We are going to try our best to schedule you at the infusion center closest to your home  In the event that we are unable to what would be your next preferred infusion site or sites? 1  [facility]  2  [facility]  3      5  Do you have transportation to take you to all of your appointments? Yes  6   Would you like the infusion center to draw labs from your port? (disregard if patient doesn't have a port or need labs for infusion appointment)

## 2022-04-22 NOTE — PATIENT INSTRUCTIONS
Iron Sucrose (By injection)   Iron Sucrose (EYE-urn JULIA-krose)  Treats anemia (lack of iron)  Brand Name(s): [employer] Rx Venofer, Venofer   There may be other brand names for this medicine  When This Medicine Should Not Be Used: This medicine is not right for everyone  You should not receive it if you had an allergic reaction to iron sucrose  How to Use This Medicine:   Injectable  · Your doctor will prescribe your dose and schedule  This medicine is given through a needle placed in a vein  · A nurse or other health provider will give you this medicine  Drugs and Foods to Avoid:      Ask your doctor or pharmacist before using any other medicine, including over-the-counter medicines, vitamins, and herbal products  Warnings While Using This Medicine:   · Tell your doctor if you are pregnant or breastfeeding, or if you have low blood pressure  · This medicine could lower your blood pressure too much and cause you to feel dizzy or lightheaded  Stand or sit up slowly if you are dizzy  · This medicine could raise your iron levels too high  Your doctor will do lab tests at regular visits to check on the effects of this medicine  Keep all appointments  Possible Side Effects While Using This Medicine:   Call your doctor right away if you notice any of these side effects:  · Allergic reaction: Itching or hives, swelling in your face or hands, swelling or tingling in your mouth or throat, chest tightness, trouble breathing  · Chest pain  · Lightheadedness, dizziness, or fainting  If you notice these less serious side effects, talk with your doctor:   · Diarrhea, nausea, vomiting  · Headache  · Muscle cramps  · Pain in your back, arms, or legs  · Pain, itching, burning, swelling, or a lump under your skin where the needle is placed  If you notice other side effects that you think are caused by this medicine, tell your doctor  Call your doctor for medical advice about side effects   You may report side effects to FDA at 4-303-FDA-4311    © Copyright [employer] 2022 Information is for End User's use only and may not be sold, redistributed or otherwise used for commercial purposes  The above information is an  only  It is not intended as medical advice for individual conditions or treatments  Talk to your doctor, nurse or pharmacist before following any medical regimen to see if it is safe and effective for you

## 2022-04-22 NOTE — TELEPHONE ENCOUNTER
Spoke to patient to let her know that her schedule has been updated per her request  Pt aware and okay with changes

## 2022-04-25 ENCOUNTER — APPOINTMENT (OUTPATIENT)
Dept: LAB | Facility: CLINIC | Age: 37
End: 2022-04-25
Payer: COMMERCIAL

## 2022-04-25 DIAGNOSIS — R71.8 MICROCYTOSIS: ICD-10-CM

## 2022-04-25 PROCEDURE — 36415 COLL VENOUS BLD VENIPUNCTURE: CPT

## 2022-04-25 PROCEDURE — 81257 HBA1/HBA2 GENE: CPT

## 2022-04-27 ENCOUNTER — APPOINTMENT (OUTPATIENT)
Dept: LAB | Facility: CLINIC | Age: 37
End: 2022-04-27
Payer: COMMERCIAL

## 2022-04-27 DIAGNOSIS — D50.9 IRON DEFICIENCY ANEMIA, UNSPECIFIED IRON DEFICIENCY ANEMIA TYPE: ICD-10-CM

## 2022-04-27 LAB — HEMOCCULT STL QL IA: NEGATIVE

## 2022-04-27 PROCEDURE — G0328 FECAL BLOOD SCRN IMMUNOASSAY: HCPCS

## 2022-05-06 ENCOUNTER — HOSPITAL ENCOUNTER (OUTPATIENT)
Dept: INFUSION CENTER | Facility: CLINIC | Age: 37
Discharge: HOME | End: 2022-05-06
Payer: COMMERCIAL

## 2022-05-06 DIAGNOSIS — D50.9 IRON DEFICIENCY ANEMIA, UNSPECIFIED IRON DEFICIENCY ANEMIA TYPE: Primary | ICD-10-CM

## 2022-05-06 PROCEDURE — 96365 THER/PROPH/DIAG IV INF INIT: CPT

## 2022-05-06 RX ORDER — SODIUM CHLORIDE 9 MG/ML
20 INJECTION, SOLUTION INTRAVENOUS ONCE
Status: COMPLETED | OUTPATIENT
Start: 2022-05-06 | End: 2022-05-06

## 2022-05-06 RX ORDER — SODIUM CHLORIDE 9 MG/ML
20 INJECTION, SOLUTION INTRAVENOUS ONCE
Status: CANCELLED | OUTPATIENT
Start: 2022-05-13

## 2022-05-06 RX ORDER — MULTIVITAMIN
1 TABLET ORAL DAILY
COMMUNITY

## 2022-05-06 RX ADMIN — SODIUM CHLORIDE 20 ML/HR: 0.9 INJECTION, SOLUTION INTRAVENOUS at 14:39

## 2022-05-06 RX ADMIN — SODIUM CHLORIDE 300 MG: 0.9 INJECTION, SOLUTION INTRAVENOUS at 14:40

## 2022-05-06 NOTE — PLAN OF CARE
Problem: INFECTION - ADULT  Goal: Absence or prevention of progression during hospitalization  Description: INTERVENTIONS:  - Assess and monitor for signs and symptoms of infection  - Monitor lab/diagnostic results  - Monitor all insertion sites, i e  indwelling lines, tubes, and drains  - Monitor endotracheal if appropriate and nasal secretions for changes in amount and color  - [place] appropriate cooling/warming therapies per order  - Administer medications as ordered  - Instruct and encourage patient and family to use good hand hygiene technique  - Identify and instruct in appropriate isolation precautions for identified infection/condition  Outcome: Progressing

## 2022-05-09 LAB — MISCELLANEOUS LAB TEST RESULT: NORMAL

## 2022-05-13 ENCOUNTER — HOSPITAL ENCOUNTER (OUTPATIENT)
Dept: INFUSION CENTER | Facility: CLINIC | Age: 37
Discharge: HOME | End: 2022-05-13
Payer: COMMERCIAL

## 2022-05-13 VITALS
RESPIRATION RATE: 18 BRPM | DIASTOLIC BLOOD PRESSURE: 81 MMHG | SYSTOLIC BLOOD PRESSURE: 119 MMHG | TEMPERATURE: 98 F | HEART RATE: 101 BPM

## 2022-05-13 DIAGNOSIS — D50.9 IRON DEFICIENCY ANEMIA, UNSPECIFIED IRON DEFICIENCY ANEMIA TYPE: Primary | ICD-10-CM

## 2022-05-13 PROCEDURE — 96365 THER/PROPH/DIAG IV INF INIT: CPT

## 2022-05-13 RX ORDER — SODIUM CHLORIDE 9 MG/ML
20 INJECTION, SOLUTION INTRAVENOUS ONCE
Status: COMPLETED | OUTPATIENT
Start: 2022-05-13 | End: 2022-05-13

## 2022-05-13 RX ORDER — SODIUM CHLORIDE 9 MG/ML
20 INJECTION, SOLUTION INTRAVENOUS ONCE
Status: CANCELLED | OUTPATIENT
Start: 2022-05-20

## 2022-05-13 RX ADMIN — SODIUM CHLORIDE 20 ML/HR: 9 INJECTION, SOLUTION INTRAVENOUS at 14:18

## 2022-05-13 RX ADMIN — IRON SUCROSE 300 MG: 20 INJECTION, SOLUTION INTRAVENOUS at 14:18

## 2022-05-20 ENCOUNTER — HOSPITAL ENCOUNTER (OUTPATIENT)
Dept: INFUSION CENTER | Facility: CLINIC | Age: 37
Discharge: HOME | End: 2022-05-20
Payer: COMMERCIAL

## 2022-05-20 VITALS
RESPIRATION RATE: 18 BRPM | SYSTOLIC BLOOD PRESSURE: 120 MMHG | TEMPERATURE: 98.9 F | DIASTOLIC BLOOD PRESSURE: 84 MMHG | HEART RATE: 112 BPM

## 2022-05-20 DIAGNOSIS — D50.9 IRON DEFICIENCY ANEMIA, UNSPECIFIED IRON DEFICIENCY ANEMIA TYPE: Primary | ICD-10-CM

## 2022-05-20 PROCEDURE — 96365 THER/PROPH/DIAG IV INF INIT: CPT

## 2022-05-20 RX ORDER — SODIUM CHLORIDE 9 MG/ML
20 INJECTION, SOLUTION INTRAVENOUS ONCE
Status: COMPLETED | OUTPATIENT
Start: 2022-05-20 | End: 2022-05-20

## 2022-05-20 RX ORDER — SODIUM CHLORIDE 9 MG/ML
20 INJECTION, SOLUTION INTRAVENOUS ONCE
Status: CANCELLED | OUTPATIENT
Start: 2022-05-27

## 2022-05-20 RX ADMIN — IRON SUCROSE 300 MG: 20 INJECTION, SOLUTION INTRAVENOUS at 14:19

## 2022-05-20 RX ADMIN — SODIUM CHLORIDE 20 ML/HR: 0.9 INJECTION, SOLUTION INTRAVENOUS at 14:19

## 2022-05-20 NOTE — PROGRESS NOTES
Pt presents for venofer infusion  No new meds or concerns  Pt tolerated infusion without adverse reaction  Future appointments discussed  AVS declined

## 2022-05-27 ENCOUNTER — HOSPITAL ENCOUNTER (OUTPATIENT)
Dept: INFUSION CENTER | Facility: CLINIC | Age: 37
Discharge: HOME | End: 2022-05-27
Payer: COMMERCIAL

## 2022-05-27 VITALS
RESPIRATION RATE: 18 BRPM | DIASTOLIC BLOOD PRESSURE: 83 MMHG | TEMPERATURE: 97.3 F | HEART RATE: 88 BPM | SYSTOLIC BLOOD PRESSURE: 117 MMHG

## 2022-05-27 DIAGNOSIS — D50.9 IRON DEFICIENCY ANEMIA, UNSPECIFIED IRON DEFICIENCY ANEMIA TYPE: Primary | ICD-10-CM

## 2022-05-27 PROCEDURE — 96365 THER/PROPH/DIAG IV INF INIT: CPT

## 2022-05-27 RX ORDER — SODIUM CHLORIDE 9 MG/ML
20 INJECTION, SOLUTION INTRAVENOUS ONCE
Status: COMPLETED | OUTPATIENT
Start: 2022-05-27 | End: 2022-05-27

## 2022-05-27 RX ORDER — SODIUM CHLORIDE 9 MG/ML
20 INJECTION, SOLUTION INTRAVENOUS ONCE
Status: CANCELLED | OUTPATIENT
Start: 2022-06-03

## 2022-05-27 RX ADMIN — SODIUM CHLORIDE 20 ML/HR: 9 INJECTION, SOLUTION INTRAVENOUS at 14:00

## 2022-05-27 RX ADMIN — IRON SUCROSE 300 MG: 20 INJECTION, SOLUTION INTRAVENOUS at 14:00

## 2022-06-03 ENCOUNTER — HOSPITAL ENCOUNTER (OUTPATIENT)
Dept: INFUSION CENTER | Facility: CLINIC | Age: 37
Discharge: HOME | End: 2022-06-03
Payer: COMMERCIAL

## 2022-06-03 VITALS
TEMPERATURE: 98.7 F | RESPIRATION RATE: 16 BRPM | DIASTOLIC BLOOD PRESSURE: 77 MMHG | HEART RATE: 95 BPM | SYSTOLIC BLOOD PRESSURE: 123 MMHG

## 2022-06-03 DIAGNOSIS — D50.9 IRON DEFICIENCY ANEMIA, UNSPECIFIED IRON DEFICIENCY ANEMIA TYPE: Primary | ICD-10-CM

## 2022-06-03 PROCEDURE — 96365 THER/PROPH/DIAG IV INF INIT: CPT

## 2022-06-03 RX ORDER — SODIUM CHLORIDE 9 MG/ML
20 INJECTION, SOLUTION INTRAVENOUS ONCE
Status: COMPLETED | OUTPATIENT
Start: 2022-06-03 | End: 2022-06-03

## 2022-06-03 RX ORDER — SODIUM CHLORIDE 9 MG/ML
20 INJECTION, SOLUTION INTRAVENOUS ONCE
Status: CANCELLED | OUTPATIENT
Start: 2022-06-03

## 2022-06-03 RX ADMIN — SODIUM CHLORIDE 20 ML/HR: 0.9 INJECTION, SOLUTION INTRAVENOUS at 13:41

## 2022-06-03 RX ADMIN — IRON SUCROSE 300 MG: 20 INJECTION, SOLUTION INTRAVENOUS at 13:52

## 2022-06-03 NOTE — PROGRESS NOTES
Pt presents for venofer  No new meds or concerns  Pt tolerated infusion without adverse reaction  Pt will follow up with her physician  JOSE holland

## 2022-06-10 ENCOUNTER — OFFICE VISIT (OUTPATIENT)
Dept: OBGYN CLINIC | Facility: CLINIC | Age: 37
End: 2022-06-10

## 2022-06-10 VITALS
BODY MASS INDEX: 22.95 KG/M2 | SYSTOLIC BLOOD PRESSURE: 122 MMHG | HEIGHT: 66 IN | OXYGEN SATURATION: 99 % | HEART RATE: 93 BPM | WEIGHT: 142.8 LBS | DIASTOLIC BLOOD PRESSURE: 84 MMHG | TEMPERATURE: 98 F

## 2022-06-10 DIAGNOSIS — Z12.4 CERVICAL CANCER SCREENING: ICD-10-CM

## 2022-06-10 DIAGNOSIS — D50.0 IRON DEFICIENCY ANEMIA DUE TO CHRONIC BLOOD LOSS: ICD-10-CM

## 2022-06-10 DIAGNOSIS — Z01.419 ENCNTR FOR GYN EXAM (GENERAL) (ROUTINE) W/O ABN FINDINGS: Primary | ICD-10-CM

## 2022-06-10 DIAGNOSIS — N92.0 MENORRHAGIA WITH REGULAR CYCLE: ICD-10-CM

## 2022-06-10 DIAGNOSIS — D25.9 UTERINE LEIOMYOMA, UNSPECIFIED LOCATION: ICD-10-CM

## 2022-06-10 PROBLEM — IMO0001 CEPHALIC VERSION: Status: RESOLVED | Noted: 2018-07-31 | Resolved: 2022-06-10

## 2022-06-10 PROBLEM — Z3A.40 40 WEEKS GESTATION OF PREGNANCY: Status: RESOLVED | Noted: 2018-08-19 | Resolved: 2022-06-10

## 2022-06-10 PROCEDURE — 99385 PREV VISIT NEW AGE 18-39: CPT | Performed by: OBSTETRICS & GYNECOLOGY

## 2022-06-10 PROCEDURE — 3008F BODY MASS INDEX DOCD: CPT | Performed by: OBSTETRICS & GYNECOLOGY

## 2022-06-10 PROCEDURE — 1036F TOBACCO NON-USER: CPT | Performed by: OBSTETRICS & GYNECOLOGY

## 2022-06-10 PROCEDURE — G0145 SCR C/V CYTO,THINLAYER,RESCR: HCPCS | Performed by: OBSTETRICS & GYNECOLOGY

## 2022-06-10 PROCEDURE — G0476 HPV COMBO ASSAY CA SCREEN: HCPCS | Performed by: OBSTETRICS & GYNECOLOGY

## 2022-06-10 NOTE — PATIENT INSTRUCTIONS
Wellness Visit for Adults   AMBULATORY CARE:   A wellness visit  is when you see your healthcare provider to get screened for health problems  Your healthcare provider will also give you advice on how to stay healthy  Write down your questions so you remember to ask them  Ask your healthcare provider how often you should have a wellness visit  What happens at a wellness visit:  Your healthcare provider will ask about your health, and your family history of health problems  This includes high blood pressure, heart disease, and cancer  He or she will ask if you have symptoms that concern you, if you smoke, and about your mood  You may also be asked about your intake of medicines, supplements, food, and alcohol  Any of the following may be done: Your weight  will be checked  Your height may also be checked so your body mass index (BMI) can be calculated  Your BMI shows if you are at a healthy weight  Your blood pressure  and heart rate will be checked  Your temperature may also be checked  Blood and urine tests  may be done  Blood tests may be done to check your cholesterol levels  Abnormal cholesterol levels increase your risk for heart disease and stroke  You may also need a blood or urine test to check for diabetes if you are at increased risk  Urine tests may be done to look for signs of an infection or kidney disease  A physical exam  includes checking your heartbeat and lungs with a stethoscope  Your healthcare provider may also check your skin to look for sun damage  Screening tests  may be recommended  A screening test is done to check for diseases that may not cause symptoms  The screening tests you may need depend on your age, gender, family history, and lifestyle habits  For example, colorectal screening may be recommended if you are 48years old or older  Screening tests you need if you are a woman:   A Pap smear  is used to screen for cervical cancer   Pap smears are usually done every 3 to 5 years depending on your age  You may need them more often if you have had abnormal Pap smear test results in the past  Ask your healthcare provider how often you should have a Pap smear  A mammogram  is an x-ray of your breasts to screen for breast cancer  Experts recommend mammograms every 2 years starting at age 48 years  You may need a mammogram at age 52 years or younger if you have an increased risk for breast cancer  Talk to your healthcare provider about when you should start having mammograms and how often you need them  Vaccines you may need:   Get an influenza vaccine  every year  The influenza vaccine protects you from the flu  Several types of viruses cause the flu  The viruses change over time, so new vaccines are made each year  Get a tetanus-diphtheria (Td) booster vaccine  every 10 years  This vaccine protects you against tetanus and diphtheria  Tetanus is a severe infection that may cause painful muscle spasms and lockjaw  Diphtheria is a severe bacterial infection that causes a thick covering in the back of your mouth and throat  Get a human papillomavirus (HPV) vaccine  if you are female and aged 23 to 32 or male 23 to 24 and never received it  This vaccine protects you from HPV infection  HPV is the most common infection spread by sexual contact  HPV may also cause vaginal, penile, and anal cancers  Get a pneumococcal vaccine  if you are aged 72 years or older  The pneumococcal vaccine is an injection given to protect you from pneumococcal disease  Pneumococcal disease is an infection caused by pneumococcal bacteria  The infection may cause pneumonia, meningitis, or an ear infection  Get a shingles vaccine  if you are 60 or older, even if you have had shingles before  The shingles vaccine is an injection to protect you from the varicella-zoster virus  This is the same virus that causes chickenpox   Shingles is a painful rash that develops in people who had chickenpox or have been exposed to the virus  How to eat healthy:  My Plate is a model for planning healthy meals  It shows the types and amounts of foods that should go on your plate  Fruits and vegetables make up about half of your plate, and grains and protein make up the other half  A serving of dairy is included on the side of your plate  The amount of calories and serving sizes you need depends on your age, gender, weight, and height  Examples of healthy foods are listed below:  Eat a variety of vegetables  such as dark green, red, and orange vegetables  You can also include canned vegetables low in sodium (salt) and frozen vegetables without added butter or sauces  Eat a variety of fresh fruits , canned fruit in 100% juice, frozen fruit, and dried fruit  Include whole grains  At least half of the grains you eat should be whole grains  Examples include whole-wheat bread, wheat pasta, brown rice, and whole-grain cereals such as oatmeal     Eat a variety of protein foods such as seafood (fish and shellfish), lean meat, and poultry without skin (turkey and chicken)  Examples of lean meats include pork leg, shoulder, or tenderloin, and beef round, sirloin, tenderloin, and extra lean ground beef  Other protein foods include eggs and egg substitutes, beans, peas, soy products, nuts, and seeds  Choose low-fat dairy products such as skim or 1% milk or low-fat yogurt, cheese, and cottage cheese  Limit unhealthy fats  such as butter, hard margarine, and shortening  Exercise:  Exercise at least 30 minutes per day on most days of the week  Some examples of exercise include walking, biking, dancing, and swimming  You can also fit in more physical activity by taking the stairs instead of the elevator or parking farther away from stores  Include muscle strengthening activities 2 days each week  Regular exercise provides many health benefits   It helps you manage your weight, and decreases your risk for type 2 diabetes, heart disease, stroke, and high blood pressure  Exercise can also help improve your mood  Ask your healthcare provider about the best exercise plan for you  General health and safety guidelines:   Do not smoke  Nicotine and other chemicals in cigarettes and cigars can cause lung damage  Ask your healthcare provider for information if you currently smoke and need help to quit  E-cigarettes or smokeless tobacco still contain nicotine  Talk to your healthcare provider before you use these products  Limit alcohol  A drink of alcohol is 12 ounces of beer, 5 ounces of wine, or 1½ ounces of liquor  Lose weight, if needed  Being overweight increases your risk of certain health conditions  These include heart disease, high blood pressure, type 2 diabetes, and certain types of cancer  Protect your skin  Do not sunbathe or use tanning beds  Use sunscreen with a SPF 15 or higher  Apply sunscreen at least 15 minutes before you go outside  Reapply sunscreen every 2 hours  Wear protective clothing, hats, and sunglasses when you are outside  Drive safely  Always wear your seatbelt  Make sure everyone in your car wears a seatbelt  A seatbelt can save your life if you are in an accident  Do not use your cell phone when you are driving  This could distract you and cause an accident  Pull over if you need to make a call or send a text message  Practice safe sex  Use latex condoms if are sexually active and have more than one partner  Your healthcare provider may recommend screening tests for sexually transmitted infections (STIs)  Wear helmets, lifejackets, and protective gear  Always wear a helmet when you ride a bike or motorcycle, go skiing, or play sports that could cause a head injury  Wear protective equipment when you play sports  Wear a lifejacket when you are on a boat or doing water sports      © Copyright [employer] 2022 Information is for End User's use only and may not be sold, redistributed or otherwise used for commercial purposes  All illustrations and images included in CareNotes® are the copyrighted property of [employer] A M [employer]  or Zackary Baker  The above information is an  only  It is not intended as medical advice for individual conditions or treatments  Talk to your doctor, nurse or pharmacist before following any medical regimen to see if it is safe and effective for you  Abnormal Uterine Bleeding    What is a normal menstrual cycle? The normal length of the menstrual cycle is typically between 24 days and 38 days  A normal menstrual period generally lasts up to 8 days  When is bleeding abnormal?  Bleeding in any of the following situations is considered abnormal uterine bleeding:  Bleeding or spotting between periods  Bleeding or spotting after sex  Heavy bleeding during your period  Menstrual cycles that are longer than 38 days or shorter than 24 days  Irregular periods in which cycle length varies by more than 7-9 days  Bleeding after menopause  At what ages is abnormal bleeding more common? Abnormal bleeding can occur at any age  However, at certain times in a womans life it is common for periods to be somewhat irregular  Periods may not occur regularly when a girl first starts having them (around age 6-17 years)  During perimenopause (beginning in the mid-40s), the number of days between periods may change  It also is normal to skip periods or for bleeding to get lighter or heavier during perimenopause  What causes abnormal bleeding?   Some of the causes of abnormal bleeding include the following:  Problems with ovulation  Fibroids and polyps  A condition in which the endometrium grows into the wall of the uterus  Bleeding disorders  Problems linked to some birth control methods, such as an intrauterine device (IUD) or birth control pills  Miscarriage  Ectopic pregnancy  Certain types of cancer, such as cancer of the uterus  Your obstetrician-gynecologist (ob-gyn) or other health care professional may start by checking for problems most common in your age group  Some of them are not serious and are easy to treat  Others can be more serious  All should be checked  How is abnormal bleeding diagnosed? Your ob-gyn or other health care professional will ask about your health history and your menstrual cycle  It may be helpful to keep track of your menstrual cycle before your visit  Note the dates, length, and type (light, medium, heavy, or spotting) of your bleeding on a calendar  You also can use a smartphone betty designed to track menstrual cycles  You will have a physical exam  You also may have blood tests  These tests check your blood count and hormone levels and rule out some diseases of the blood  You also may have a pregnancy test and tests for sexually transmitted  infections (STIs)  What tests may be needed to diagnose abnormal bleeding? Based on your symptoms and your age, other tests may be needed  Some of these tests can be done in your ob-gyns office  Others may be done at a hospital or surgical center:  Ultrasound exam--Sound waves are used to make a picture of the pelvic organs  Hysteroscopy--A thin, lighted scope is inserted through the vagina and the opening of the cervix  It allows your ob-gyn or other health care professional to see the inside of the uterus  Endometrial biopsy--A sample of the endometrium is removed and looked at under a microscope  Sonohysterography--Fluid is placed in the uterus through a thin tube while ultrasound images are made of the inside of the uterus  Magnetic resonance imaging (MRI)--An MRI exam uses a strong magnetic field and sound waves to create images of the internal organs  Computed tomography (CT)--This X-ray procedure shows internal organs and structures in cross section  What medications are used to help control abnormal bleeding?   Medications often are tried first to treat irregular or heavy menstrual bleeding  The medications that may be used include the following:  Hormonal birth control methods--Birth control pills, the skin patch, and the vaginal ring contain hormones  These hormones can lighten menstrual flow  They also help make periods more regular  Gonadotropin-releasing hormone (GnRH) agonists--These drugs can stop the menstrual cycle and reduce the size of fibroids  Tranexamic acid--This medication treats heavy menstrual bleeding  Nonsteroidal anti-inflammatory drugs--These drugs, which include ibuprofen, may help control heavy bleeding and relieve menstrual cramps  Antibiotics--If you have an infection, you may be given an antibiotic  Special medications--If you have a bleeding disorder, your treatment may include medication to help your blood clot  What types of surgery are performed to treat abnormal bleeding? If medication does not reduce your bleeding, a surgical procedure may be needed  There are different types of surgery depending on your condition, your age, and whether you want to have more children  Endometrial ablation destroys the lining of the uterus  It stops or reduces the total amount of bleeding  Pregnancy is not likely after ablation, but it can happen  If it does, the risk of serious complications, including life-threatening bleeding, is greatly increased  If you have this procedure, you will need to use birth control until after menopause  Uterine artery embolization is a procedure used to treat fibroids  This procedure blocks the blood vessels to the uterus, which in turn stops the blood flow that fibroids need to grow  Another treatment, myomectomy, removes the fibroids but not the uterus  Hysterectomy, the surgical removal of the uterus, is used to treat some conditions or when other treatments have failed  Hysterectomy also is used to treat endometrial cancer   After the uterus is removed, a woman can no longer get pregnant and will no longer have periods  Glossary  Abnormal Uterine Bleeding: Bleeding from the uterus that differs in frequency, regularity, duration, or amount from normal uterine bleeding in the absence of pregnancy  Cervix: The opening of the uterus at the top of the vagina  Ectopic Pregnancy: A pregnancy in which the fertilized egg begins to grow in a place other than inside the uterus, usually in the fallopian tubes  Endometrium: The lining of the uterus  Fibroids: Benign (noncancerous) growths that form on the inside of the uterus, on its outer surface, or within the uterine wall itself  Gonadotropin-releasing Hormone [facility]) Agonists: Medical therapy used to block the effects of certain hormones  Intrauterine device (IUD): A small device that is inserted and left inside the uterus to prevent pregnancy  Menopause: The process in a womans life when ovaries stop functioning and menstruation stops  Menstrual Cycle: The monthly process of changes that occur to prepare a womans body for possible pregnancy  A menstrual cycle is defined as the first day of menstrual bleeding of one cycle to the first day of menstrual bleeding of the next cycle  Miscarriage: The spontaneous loss of a pregnancy before the fetus can survive outside the uterus  Obstetrician-Gynecologist (Ob-Gyn): A physician with special skills, training, and education in womens health  Ovulation: The release of an egg from one of the ovaries  Perimenopause: The period before menopause that usually extends from age 39 years to 54 years  Polyps: Growths that develop from membrane tissue, such as that lining the inside of the uterus  Sexually Transmitted Infections (STIs): Infections that are spread by sexual contact, including chlamydia, gonorrhea, human papillomavirus (HPV), herpes, syphilis, and human immunodeficiency virus (HIV, the cause of acquired immunodeficiency syndrome [AIDS])     Tranexamic Acid: A medication prescribed to treat or prevent heavy bleeding  Uterus: A muscular organ located in the female pelvis that contains and nourishes the developing fetus during pregnancy

## 2022-06-10 NOTE — PROGRESS NOTES
Assessment        Diagnoses and all orders for this visit:    Encntr for gyn exam (general) (routine) w/o abn findings    Cervical cancer screening  -     Liquid-based pap, screening    Menorrhagia with regular cycle  -     US pelvis complete w transvaginal; Future    Iron deficiency anemia due to chronic blood loss    Uterine leiomyoma, unspecified location  -     US pelvis complete w transvaginal; Future    Other orders  -     Ferrous Sulfate (IRON PO); Take 75 mg by mouth daily             Plan      All questions answered  Await pap smear results  Diagnosis explained in detail, including differential   Educational material distributed  Follow up in 3 weeks  Follow up as needed  Pelvic ultrasound  Thin prep Pap smear  Patient with heavy menstrual bleeding and history of fibroids and now anemia  Advised pelvic ultrasound  She declines endometrial biopsy for Down  She denies treatment at this point but I did discussed with her options including tranexamic acid, hormonal contraception, levonorgestrel Intrauterine device versus ablation versus hysterectomy  Follow-up for discussion of results after pelvic ultrasound  Rodri Turner is a 40 y o  female who presents for annual exam       Chief Complaint   Patient presents with   [address] pt    Gynecologic Exam     Last PAP 5/29/19 normal; Pt states she was dx as anemic, wants to discuss, pt also has hx of small fibroid       Previous OBGYN [facility]  Sees [facility] for anemia (iron deficiency) - 5 iron infusions  She is sexually active,  x 12 years, same partner x 15 years    Sign language program    She states periods have been heavier since having children    Periods are monthly, lasting 5-6 days, uses about 5-6 pad/s day  She has some cramping  She has no pelvic pain  She was told she had fibroids noted during pregnancy that did not grow  She thinks she is done having children  She declines OCPS      She had some depression with OCPS      Last Pap: 3 years ago  Last mammogram: n/a  Colorectal cancer screening: n/a    HPV vaccine completed:no  Current contraception: condoms  History of abnormal Pap smear: no  History of abnormal mammogram: no  Family history of uterine or ovarian cancer: no  Family history of breast cancer: no  Family history of colon cancer: no      Hemoccult negative  Hgb 7 5 3/25/22      Menstrual History:  OB History        2    Para   2    Term   2       0    AB   0    Living   2       SAB        IAB        Ectopic        Multiple   0    Live Births   2                Menarche age: 15  Patient's last menstrual period was 2022 (exact date)  Past Medical History:   Diagnosis Date    Allergic     - seasonal allergies    Anemia     Anxiety     Pregnancy     x2     No past surgical history on file  Family History   Problem Relation Age of Onset    Thyroid cancer Mother     Diabetes Father     Asthma Brother     No Known Problems Maternal Grandmother     Heart disease Maternal Grandfather        Social History     Tobacco Use    Smoking status: Never Smoker    Smokeless tobacco: Never Used   Vaping Use    Vaping Use: Never used   Substance Use Topics    Alcohol use: Yes     Comment: social    Drug use: No          Current Outpatient Medications:     Ferrous Sulfate (IRON PO), Take 75 mg by mouth daily, Disp: , Rfl:     Multiple Vitamin (multivitamin) tablet, Take 1 tablet by mouth daily Pre ruperto - no iron, Disp: , Rfl:     Allergies   Allergen Reactions    No Active Allergies            Review of Systems   Constitutional: Negative  HENT: Negative  Eyes: Negative  Respiratory: Negative  Cardiovascular: Negative  Gastrointestinal: Negative  Endocrine: Negative  Genitourinary:        As noted in HPI   Musculoskeletal: Negative  Skin: Negative  Allergic/Immunologic: Negative  Neurological: Negative      Hematological: Negative  Psychiatric/Behavioral: Negative  /84 (BP Location: Right arm, Patient Position: Sitting, Cuff Size: Adult)   Pulse 93   Temp 98 °F (36 7 °C) (Tympanic)   Ht 5' 6" (1 676 m)   Wt 64 8 kg (142 lb 12 8 oz)   LMP 06/05/2022 (Exact Date)   SpO2 99%   Breastfeeding No   BMI 23 05 kg/m²         Physical Exam  Constitutional:       Appearance: She is well-developed  Genitourinary:      Vulva, bladder and rectum normal       No lesions in the vagina  Genitourinary Comments:         Right Labia: No rash, tenderness, lesions, skin changes or Bartholin's cyst      Left Labia: No tenderness, lesions, skin changes, Bartholin's cyst or rash  No inguinal adenopathy present in the right or left side  No vaginal discharge, tenderness or bleeding  No vaginal prolapse present  No vaginal atrophy present  Right Adnexa: not tender, not full and no mass present  Left Adnexa: not tender, not full and no mass present  No cervical motion tenderness, friability, lesion or polyp  Uterus is not enlarged or tender  Pelvic exam was performed with patient in the lithotomy position  Rectum:      No external hemorrhoid  Breasts:      Right: No mass, nipple discharge, skin change or tenderness  Left: No mass, nipple discharge, skin change or tenderness  HENT:      Head: Normocephalic  Nose: Nose normal    Eyes:      Conjunctiva/sclera: Conjunctivae normal    Neck:      Thyroid: No thyromegaly  Cardiovascular:      Rate and Rhythm: Normal rate and regular rhythm  Heart sounds: Normal heart sounds  No murmur heard  Pulmonary:      Effort: Pulmonary effort is normal  No respiratory distress  Breath sounds: Normal breath sounds  No wheezing or rales  Abdominal:      General: There is no distension  Palpations: Abdomen is soft  There is no mass  Tenderness: There is no abdominal tenderness  There is no guarding or rebound  Musculoskeletal:         General: No tenderness  Cervical back: Neck supple  No muscular tenderness  Lymphadenopathy:      Cervical: No cervical adenopathy  Lower Body: No right inguinal adenopathy  No left inguinal adenopathy  Neurological:      Mental Status: She is alert and oriented to person, place, and time  Skin:     General: Skin is warm and dry     Psychiatric:         Mood and Affect: Mood normal          Behavior: Behavior normal

## 2022-06-13 DIAGNOSIS — D50.9 IRON DEFICIENCY ANEMIA, UNSPECIFIED IRON DEFICIENCY ANEMIA TYPE: Primary | ICD-10-CM

## 2022-06-13 RX ORDER — FERROUS SULFATE TAB EC 324 MG (65 MG FE EQUIVALENT) 324 (65 FE) MG
324 TABLET DELAYED RESPONSE ORAL
Qty: 90 TABLET | Refills: 1 | Status: SHIPPED | OUTPATIENT
Start: 2022-06-13 | End: 2022-06-14

## 2022-06-14 DIAGNOSIS — D50.9 IRON DEFICIENCY ANEMIA, UNSPECIFIED IRON DEFICIENCY ANEMIA TYPE: Primary | ICD-10-CM

## 2022-06-14 LAB
HPV HR 12 DNA CVX QL NAA+PROBE: NEGATIVE
HPV16 DNA CVX QL NAA+PROBE: NEGATIVE
HPV18 DNA CVX QL NAA+PROBE: NEGATIVE

## 2022-06-14 RX ORDER — FERROUS GLUCONATE 324(37.5)
324 TABLET ORAL
Qty: 90 TABLET | Refills: 1 | Status: SHIPPED | OUTPATIENT
Start: 2022-06-14

## 2022-06-16 LAB
LAB AP GYN PRIMARY INTERPRETATION: NORMAL
Lab: NORMAL

## 2022-06-22 ENCOUNTER — HOSPITAL ENCOUNTER (OUTPATIENT)
Dept: ULTRASOUND IMAGING | Facility: MEDICAL CENTER | Age: 37
Discharge: HOME | End: 2022-06-22
Payer: COMMERCIAL

## 2022-06-22 DIAGNOSIS — D25.9 UTERINE LEIOMYOMA, UNSPECIFIED LOCATION: ICD-10-CM

## 2022-06-22 DIAGNOSIS — N92.0 MENORRHAGIA WITH REGULAR CYCLE: ICD-10-CM

## 2022-06-22 PROCEDURE — 76830 TRANSVAGINAL US NON-OB: CPT

## 2022-06-22 PROCEDURE — 76856 US EXAM PELVIC COMPLETE: CPT

## 2022-06-28 NOTE — PROGRESS NOTES
Assessment/Plan:    Problem List Items Addressed This Visit        Other    Iron deficiency anemia      Other Visit Diagnoses     Hemorrhagic ovarian cyst    -  Primary    Relevant Orders    US pelvis complete w transvaginal    Menorrhagia with regular cycle        Relevant Medications    Tranexamic Acid 650 MG TABS        Offered to do CBC now but pt would like to wait for hematology Appointment before any more blood draws  She has an appointment in July 2022  CBC was ordered to be cc to me    Discussed with patient ultrasound results in detail  Discussed with patient hemorrhagic cyst and expected ultrasound in 8-12 weeks which was ordered  We will call her with results  Discussed with patient potential symptoms from hemorrhagic cyst such as pelvic pain    With regards to heavy bleeding, she does have a small fibroid less than 1 cm  Discussed with patient other differentials including adenomyosis  She does not have any risk factors for hyperplasia although I did discuss that another potential workup could be an endometrial biopsy  With regards to treatment options for heavy menses, patient is undecided about having any more children  Discussed with patient surgical options such as endometrial ablation or hysterectomy  With regards to medical therapy, I did offer to use tranexamic acid as needed for heavy menses  She does not have any risk factors for DVT or PE  Discussed with patient potential risk including increased risk of DVT or PE  Advised she may take this as needed for heavy menses  Advised to call if with any side effects or if she feels this is not working  I did discuss other potential options including levonorgestrel Intrauterine device, birth control pills for control of heavy menses  She declines hormonal contraception at this point and wishes to continue to use condoms for now  Follow-up in 1 year for annual gyn exam, sooner if needed    Repeat ultrasound is pending in September and we will call her with results  Subjective   Patient ID: Heath Cordova is a 40 y o  female  Patient is here for a follow-up  Chief Complaint   Patient presents with   [address] results       Patient previously seen for annual gyn exam   Patient reported a history of anemia  Patient also reports a history of fibroids  Patient reported heavy periods including cramping  Patient reports being followed by Hematology Oncology for anemia    Menstrual History:  OB History        2    Para   2    Term   2       0    AB   0    Living   2       SAB        IAB        Ectopic        Multiple   0    Live Births   2                Menarche age: 15  Patient's last menstrual period was 2022  Past Medical History:   Diagnosis Date    Allergic     - seasonal allergies    Anemia     Anxiety     Pregnancy     x2       History reviewed  No pertinent surgical history      Social History     Tobacco Use    Smoking status: Never Smoker    Smokeless tobacco: Never Used   Vaping Use    Vaping Use: Never used   Substance Use Topics    Alcohol use: Yes     Comment: social    Drug use: No       Allergies   Allergen Reactions    No Active Allergies          Current Outpatient Medications:     Tranexamic Acid 650 MG TABS, Take 2 tablets (1,300 mg total) by mouth 3 (three) times a day as needed (heavy menses), Disp: 30 tablet, Rfl: 6    ferrous gluconate 324 (37 5 Fe) mg, Take 1 tablet (324 mg total) by mouth daily before breakfast, Disp: 90 tablet, Rfl: 1    Multiple Vitamin (multivitamin) tablet, Take 1 tablet by mouth daily Pre  - no iron, Disp: , Rfl:       Review of Systems      /82 (BP Location: Right arm, Patient Position: Sitting, Cuff Size: Standard)   Pulse 82   Ht 5' 6" (1 676 m)   Wt 65 kg (143 lb 3 2 oz)   LMP 2022   BMI 23 11 kg/m²       OBGyn Exam          The remainder of her physical examination was deferred as she was here today for consultation and discussion  I have spent ** minutes on day of encounter, time spent involved pre-charting, review of previous records, face to face encounter, counseling and post visit documentation  Study Result    Narrative & Impression   PELVIC ULTRASOUND, COMPLETE     INDICATION:  The patient is 40years old  N92 0: Excessive and frequent menstruation with regular cycle  D25 9: Leiomyoma of uterus, unspecified      COMPARISON: None     TECHNIQUE:   Transabdominal pelvic ultrasound was performed in sagittal and transverse planes with a curvilinear transducer  Additional transvaginal imaging was performed to better evaluate the endometrium and ovaries  Imaging included volumetric   sweeps as well as traditional still imaging technique      FINDINGS:     UTERUS:  The uterus is anteverted in position, measuring 9 5 x 4 3 x 5 7 cm  The uterus has a normal contour and echotexture  There is a 7 mm subserosal calcification in the posterior uterine body which may represent a tiny calcified fibroid  The cervix appears within normal limits      ENDOMETRIUM:    The endometrial echo complex has an AP caliber of 11 0 mm  Its appearance is within normal limits for age and cycle and shows no filling defects        OVARIES/ADNEXA:  Right ovary:  3 8 x 2 9 x 3 0 cm  17 2 mL  No suspicious right ovarian abnormality  There is a 2 4 x 1 9 x 2 4 cm cystic ovarian lesion with multiple internal septations and nonvascular peripheral soft tissue, likely a hemorrhagic cyst with retractile clot  Doppler flow within normal limits      Left ovary:  2 4 x 2 1 x 2 2 cm  5 8 mL  No suspicious left ovarian abnormality  Doppler flow within normal limits      No suspicious adnexal mass or loculated collections  There is no free fluid      IMPRESSION:     Tiny uterine calcification, which may represent a calcified fibroid or simple calcification  Endometrium is normal in caliber       2 4 cm cystic right ovarian lesion, likely a hemorrhagic cyst with retractile clot  Based on the ACR O-RADS system, this is O-RADS category 2 (almost certain benign with <3% risk of malignancy ) The management recommendation is followup pelvic   ultrasound in 8 to 12 weeks  REFERENCE: Radiology 2020; [id number]     Normal left ovary      The study was marked in EPIC for significant notification               Workstation performed: [id number]     Study Result    Narrative & Impression   PELVIC ULTRASOUND, COMPLETE     INDICATION:  The patient is 40years old  N92 0: Excessive and frequent menstruation with regular cycle  D25 9: Leiomyoma of uterus, unspecified      COMPARISON: None     TECHNIQUE:   Transabdominal pelvic ultrasound was performed in sagittal and transverse planes with a curvilinear transducer  Additional transvaginal imaging was performed to better evaluate the endometrium and ovaries  Imaging included volumetric   sweeps as well as traditional still imaging technique      FINDINGS:     UTERUS:  The uterus is anteverted in position, measuring 9 5 x 4 3 x 5 7 cm  The uterus has a normal contour and echotexture  There is a 7 mm subserosal calcification in the posterior uterine body which may represent a tiny calcified fibroid  The cervix appears within normal limits      ENDOMETRIUM:    The endometrial echo complex has an AP caliber of 11 0 mm  Its appearance is within normal limits for age and cycle and shows no filling defects        OVARIES/ADNEXA:  Right ovary:  3 8 x 2 9 x 3 0 cm  17 2 mL  No suspicious right ovarian abnormality  There is a 2 4 x 1 9 x 2 4 cm cystic ovarian lesion with multiple internal septations and nonvascular peripheral soft tissue, likely a hemorrhagic cyst with retractile clot  Doppler flow within normal limits      Left ovary:  2 4 x 2 1 x 2 2 cm  5 8 mL  No suspicious left ovarian abnormality  Doppler flow within normal limits      No suspicious adnexal mass or loculated collections    There is no free fluid      IMPRESSION:     Tiny uterine calcification, which may represent a calcified fibroid or simple calcification  Endometrium is normal in caliber  2 4 cm cystic right ovarian lesion, likely a hemorrhagic cyst with retractile clot  Based on the ACR O-RADS system, this is O-RADS category 2 (almost certain benign with <2% risk of malignancy ) The management recommendation is followup pelvic   ultrasound in 8 to 12 weeks    REFERENCE: Radiology 2020; [id number]     Normal left ovary      The study was marked in EPIC for significant notification               Workstation performed: [id number]

## 2022-07-01 ENCOUNTER — OFFICE VISIT (OUTPATIENT)
Dept: OBGYN CLINIC | Facility: CLINIC | Age: 37
End: 2022-07-01
Payer: COMMERCIAL

## 2022-07-01 VITALS
HEART RATE: 82 BPM | HEIGHT: 66 IN | SYSTOLIC BLOOD PRESSURE: 122 MMHG | WEIGHT: 143.2 LBS | BODY MASS INDEX: 23.01 KG/M2 | DIASTOLIC BLOOD PRESSURE: 82 MMHG

## 2022-07-01 DIAGNOSIS — N92.0 MENORRHAGIA WITH REGULAR CYCLE: ICD-10-CM

## 2022-07-01 DIAGNOSIS — N83.209 HEMORRHAGIC OVARIAN CYST: Primary | ICD-10-CM

## 2022-07-01 DIAGNOSIS — D50.0 IRON DEFICIENCY ANEMIA DUE TO CHRONIC BLOOD LOSS: ICD-10-CM

## 2022-07-01 PROCEDURE — 99213 OFFICE O/P EST LOW 20 MIN: CPT | Performed by: OBSTETRICS & GYNECOLOGY

## 2022-07-01 RX ORDER — TRANEXAMIC ACID 650 MG/1
1300 TABLET ORAL 3 TIMES DAILY PRN
Qty: 30 TABLET | Refills: 6 | Status: SHIPPED | OUTPATIENT
Start: 2022-07-01

## 2022-07-06 ENCOUNTER — TELEPHONE (OUTPATIENT)
Dept: HEMATOLOGY ONCOLOGY | Facility: CLINIC | Age: 37
End: 2022-07-06

## 2022-07-26 ENCOUNTER — APPOINTMENT (OUTPATIENT)
Dept: LAB | Facility: CLINIC | Age: 37
End: 2022-07-26
Payer: COMMERCIAL

## 2022-07-26 DIAGNOSIS — D50.9 IRON DEFICIENCY ANEMIA, UNSPECIFIED IRON DEFICIENCY ANEMIA TYPE: ICD-10-CM

## 2022-07-26 LAB
ERYTHROCYTE [DISTWIDTH] IN BLOOD BY AUTOMATED COUNT: 13 % (ref 11.6–15.1)
FERRITIN SERPL-MCNC: 126 NG/ML (ref 8–388)
HCT VFR BLD AUTO: 43.7 % (ref 34.8–46.1)
HGB BLD-MCNC: 13.8 G/DL (ref 11.5–15.4)
IRON SATN MFR SERPL: 32 % (ref 15–50)
IRON SERPL-MCNC: 103 UG/DL (ref 50–170)
MCH RBC QN AUTO: 29.7 PG (ref 26.8–34.3)
MCHC RBC AUTO-ENTMCNC: 31.6 G/DL (ref 31.4–37.4)
MCV RBC AUTO: 94 FL (ref 82–98)
PLATELET # BLD AUTO: 225 THOUSANDS/UL (ref 149–390)
PMV BLD AUTO: 10.1 FL (ref 8.9–12.7)
RBC # BLD AUTO: 4.64 MILLION/UL (ref 3.81–5.12)
TIBC SERPL-MCNC: 320 UG/DL (ref 250–450)
WBC # BLD AUTO: 4.52 THOUSAND/UL (ref 4.31–10.16)

## 2022-07-26 PROCEDURE — 36415 COLL VENOUS BLD VENIPUNCTURE: CPT

## 2022-07-26 PROCEDURE — 83540 ASSAY OF IRON: CPT

## 2022-07-26 PROCEDURE — 82728 ASSAY OF FERRITIN: CPT

## 2022-07-26 PROCEDURE — 85027 COMPLETE CBC AUTOMATED: CPT

## 2022-07-26 PROCEDURE — 83550 IRON BINDING TEST: CPT

## 2022-08-08 ENCOUNTER — OFFICE VISIT (OUTPATIENT)
Dept: FAMILY MEDICINE CLINIC | Facility: CLINIC | Age: 37
End: 2022-08-08
Payer: COMMERCIAL

## 2022-08-08 VITALS
OXYGEN SATURATION: 98 % | DIASTOLIC BLOOD PRESSURE: 82 MMHG | HEIGHT: 66 IN | HEART RATE: 102 BPM | BODY MASS INDEX: 23.3 KG/M2 | TEMPERATURE: 98.4 F | WEIGHT: 145 LBS | SYSTOLIC BLOOD PRESSURE: 110 MMHG

## 2022-08-08 DIAGNOSIS — D50.9 IRON DEFICIENCY ANEMIA, UNSPECIFIED IRON DEFICIENCY ANEMIA TYPE: Primary | ICD-10-CM

## 2022-08-08 PROCEDURE — 99213 OFFICE O/P EST LOW 20 MIN: CPT | Performed by: FAMILY MEDICINE

## 2022-08-08 NOTE — PROGRESS NOTES
Assessment/Plan:    1  Iron deficiency anemia, unspecified iron deficiency anemia type  Assessment & Plan:  Patient's iron deficiency anemia adequately treated and recent blood work shows improvement and normal CBC and iron studies  She should continue taking Ferrous gluconate twice daily  Follow up with hematology for treatment recommendations  We also discussed heavy menstrual cycle are result of her recent pelvic ultrasound  Continue management per OB/gyn  She may take tranexamic acid as prescribed during heavy bleeding cycles  If symptoms worsen, follow up with OB/gyn  Subjective:      Patient ID: Calvin Wheeler is a 40 y o  female  HPI    Patient with PMHx of anemia is presenting for routine follow up  She is tolerating the iron supplement well  She denies constipation and has noticed that her stools are softer than usual   Her recent CBC showed normal Hgb  Her other testing to rule out causes of anemia were also normal   Patient has not noticed a big improvement in fatigue but states that she feels well overall  She discussed her symptoms of heavy menstrual bleeding with OB/gyn  Pelvic US showed right ovarian cyst and uterine fibroid  After discussing medication treatments she was prescribed tranexamic acid to take during her heavy bleeding  She has not yet started the medication  Patient has no acute complaints today      The following portions of the patient's history were reviewed and updated as appropriate: allergies, current medications, past family history, past medical history, past social history, past surgical history, and problem list       Current Outpatient Medications:     ferrous gluconate 324 (37 5 Fe) mg, Take 1 tablet (324 mg total) by mouth daily before breakfast, Disp: 90 tablet, Rfl: 1    Multiple Vitamin (multivitamin) tablet, Take 1 tablet by mouth daily Pre ruperto - no iron, Disp: , Rfl:     Tranexamic Acid 650 MG TABS, Take 2 tablets (1,300 mg total) by mouth 3 (three) times a day as needed (heavy menses), Disp: 30 tablet, Rfl: 6      Review of Systems   Constitutional: Negative for chills and fever  HENT: Negative for ear pain and sore throat  Eyes: Negative for pain and visual disturbance  Respiratory: Negative for cough and shortness of breath  Cardiovascular: Negative for chest pain and palpitations  Gastrointestinal: Negative for abdominal pain and vomiting  Genitourinary: Negative for dysuria and hematuria  Musculoskeletal: Negative for arthralgias and back pain  Skin: Negative for color change and rash  Neurological: Negative for seizures and syncope  All other systems reviewed and are negative  Objective:      /82 (BP Location: Left arm, Patient Position: Sitting, Cuff Size: Standard)   Pulse 102   Temp 98 4 °F (36 9 °C) (Tympanic)   Ht 5' 6" (1 676 m)   Wt 65 8 kg (145 lb)   SpO2 98%   BMI 23 40 kg/m²          Physical Exam  Vitals and nursing note reviewed  Constitutional:       General: She is not in acute distress  Appearance: Normal appearance  She is not toxic-appearing  HENT:      Head: Normocephalic and atraumatic  Nose: Nose normal  No congestion  Mouth/Throat:      Mouth: Mucous membranes are moist       Pharynx: Oropharynx is clear  No oropharyngeal exudate or posterior oropharyngeal erythema  Eyes:      Extraocular Movements: Extraocular movements intact  Conjunctiva/sclera: Conjunctivae normal       Pupils: Pupils are equal, round, and reactive to light  Cardiovascular:      Rate and Rhythm: Normal rate and regular rhythm  Heart sounds: Normal heart sounds  No murmur heard  Pulmonary:      Effort: Pulmonary effort is normal  No respiratory distress  Breath sounds: Normal breath sounds  No wheezing  Musculoskeletal:      Cervical back: Normal range of motion  Skin:     General: Skin is warm  Neurological:      General: No focal deficit present        Mental Status: She is alert and oriented to person, place, and time  Mental status is at baseline  Psychiatric:         Mood and Affect: Mood normal          Behavior: Behavior normal          Thought Content:  Thought content normal          Judgment: Judgment normal

## 2022-08-09 NOTE — ASSESSMENT & PLAN NOTE
Patient's iron deficiency anemia adequately treated and recent blood work shows improvement and normal CBC and iron studies  She should continue taking Ferrous gluconate twice daily  Follow up with hematology for treatment recommendations  We also discussed heavy menstrual cycle are result of her recent pelvic ultrasound  Continue management per OB/gyn  She may take tranexamic acid as prescribed during heavy bleeding cycles  If symptoms worsen, follow up with OB/gyn

## 2022-08-29 ENCOUNTER — TELEPHONE (OUTPATIENT)
Dept: HEMATOLOGY ONCOLOGY | Facility: CLINIC | Age: 37
End: 2022-08-29

## 2022-08-29 NOTE — TELEPHONE ENCOUNTER
Called patient and left a voice message stating that her 9/16 appointment with Caryle Seitz needed to be rescheduled as he will not be in the office on that day  Had an opening for this Friday 9/2 at 9:40am  Stated that if this does not work for patient to please give the office a call back

## 2022-09-02 ENCOUNTER — OFFICE VISIT (OUTPATIENT)
Dept: HEMATOLOGY ONCOLOGY | Facility: CLINIC | Age: 37
End: 2022-09-02
Payer: COMMERCIAL

## 2022-09-02 VITALS
OXYGEN SATURATION: 99 % | BODY MASS INDEX: 22.91 KG/M2 | TEMPERATURE: 96.9 F | HEART RATE: 101 BPM | HEIGHT: 67 IN | RESPIRATION RATE: 18 BRPM | DIASTOLIC BLOOD PRESSURE: 70 MMHG | WEIGHT: 146 LBS | SYSTOLIC BLOOD PRESSURE: 122 MMHG

## 2022-09-02 DIAGNOSIS — D50.0 IRON DEFICIENCY ANEMIA DUE TO CHRONIC BLOOD LOSS: Primary | ICD-10-CM

## 2022-09-02 PROCEDURE — 99214 OFFICE O/P EST MOD 30 MIN: CPT | Performed by: INTERNAL MEDICINE

## 2022-09-02 NOTE — PROGRESS NOTES
[facility] - Hematology & Medical Oncology  Outpatient Visit Encounter Note      Shelia Tompkins 40 y o  female 1985 [id number] Date:  9/2/2022    HEMATOLOGICAL HISTORY        Clotting History Denies   Bleeding History Denies   Cancer History Denies   Family Cancer History Mother with thyroid cancer   H/O COVID Infection Denies   H/O COVID Vaccination 2/2 [facility]   H/O Blood/Plt Transfusion Denies   Tobacco Use Denies   Alcohol Use Occasionally   Occupation Part time sign language interpretor       SUBJECTIVE      Shelia Tompkins is a 40 y o  here for new consultation with me today  The patient is referred by PCP Dr Jae Gregg and the reason for consultation is anemia  Her CBC shows significant microcytic anemia:   7/18/2015  3/25/2022    WBC 9 93 4 70   Hemoglobin 11 5 7 9 (L)   HCT 35 3 29 0 (L)   MCV 86 60 (L)   Platelet Count 430 198     Her differential was unremarkable:   3/25/2022    Immature Grans Absolute 0 01   Absolute Neutrophils 2 33   Lymphocytes Absolute 1 64   Absolute Monocytes 0 55   Absolute Eosinophils 0 11   Basophils Absolute 0 06     She says she has regular menstrual periods  She says that she has had heavier menstrual bleeding after giving birth 3 years ago  She says that she uses cloth pads which she changes whenever she goes to the bathroom  She notes that she sees clots as well  She bleeds 5 days in total     She notes baseline fatigue which she attributes to being a mom  She notes lightheadedness and dizziness with standing up quickly  Denies insomnia, pagophagia, RLS, brittle nails, or thinning hair  This Visit    Doing well  Feels better than better  Taking oral pills  No issues with that  Previous low iron d/t heavy periods  I have reviewed the relevant past medical, surgical, social and family history  I have also reviewed allergies and medications for this patient      Review of Systems  Review of Systems   All other systems reviewed and are negative  OBJECTIVE     Physical Exam  Vitals:    09/02/22 0946   BP: 122/70   BP Location: Left arm   Pulse: 101   Resp: 18   Temp: (!) 96 9 °F (36 1 °C)   TempSrc: Tympanic   SpO2: 99%   Weight: 66 2 kg (146 lb)   Height: 5' 6 5" (1 689 m)     Physical Exam  Vitals and nursing note reviewed  Constitutional:       General: She is not in acute distress  Appearance: She is well-developed  HENT:      Head: Normocephalic and atraumatic  Eyes:      Conjunctiva/sclera: Conjunctivae normal    Cardiovascular:      Rate and Rhythm: Normal rate  Pulmonary:      Effort: Pulmonary effort is normal  No respiratory distress  Abdominal:      General: There is no distension  Musculoskeletal:         General: No swelling  Neurological:      General: No focal deficit present  Mental Status: She is alert and oriented to person, place, and time  Imaging  Relevant imaging reviewed in chart    Labs  Relevant labs reviewed in chart     ASSESSMENT & PLAN      Diagnosis ICD-10-CM Associated Orders   1  Iron deficiency anemia due to chronic blood loss  D50 0          40 y o  Female seen in follow-up for iron deficiency anemia  Discussion/Plan/Labs   In review of her labs, her iron deficiency and anemia state have resolved  Hence there is no indication for further intravenous iron therapy  She can continue taking her oral iron supplementation  I discussed with her that this can now be managed by her family physician   I recommend that her iron panel and CBC are checked at least 2 to 3 times a year or as deemed clinically fit   I said that if her iron saturation goes below 10%, it might be an indication for her to come back and see me for consideration of intravenous therapy  But this is subject to clinical situation and assessment     In the meantime, she can taper her oral iron therapy as she deems fit by taking a once a day tablet and then stopping eventually and monitoring her labs for any early signs of iron deficiency   Because no further hematologic work-up or intervention is warranted at this time, it is no longer indicated for her to follow up with me  Routine follow-up and monitoring by her PCP is sufficient  she is aware that if her lab work worsens or her PCP becomes concerned, she is welcome to make an appointment with our office again  We also welcome messaging via Epic inbox or Quack should any questions arise  I answered all her questions and she voiced agreement  Follow Up   PRN      All questions were answered to the patient's satisfaction during this encounter  They appreciated and thanked me for spending time with them  The patient knows the contact information for our office and know to reach out for any relevant concerns related to this encounter  For all other listed problems and medical diagnosis in his chart - they are managed by PCP and/or other specialists, which patient acknowledges          Hematology & Medical Oncology

## 2022-09-16 ENCOUNTER — TELEPHONE (OUTPATIENT)
Dept: FAMILY MEDICINE CLINIC | Facility: CLINIC | Age: 37
End: 2022-09-16

## 2022-09-16 NOTE — TELEPHONE ENCOUNTER
Pt is requesting blood work made through [facility]  Dr Brigid Saunders  mentioned ordering lab during previous appt  Please call pt when order is placed

## 2022-09-19 DIAGNOSIS — Z13.29 SCREENING FOR THYROID DISORDER: ICD-10-CM

## 2022-09-19 DIAGNOSIS — R73.01 ELEVATED FASTING GLUCOSE: ICD-10-CM

## 2022-09-19 DIAGNOSIS — D50.9 IRON DEFICIENCY ANEMIA, UNSPECIFIED IRON DEFICIENCY ANEMIA TYPE: Primary | ICD-10-CM

## 2022-09-23 ENCOUNTER — HOSPITAL ENCOUNTER (OUTPATIENT)
Dept: ULTRASOUND IMAGING | Facility: MEDICAL CENTER | Age: 37
Discharge: HOME | End: 2022-09-23
Payer: COMMERCIAL

## 2022-09-23 ENCOUNTER — APPOINTMENT (OUTPATIENT)
Dept: LAB | Facility: CLINIC | Age: 37
End: 2022-09-23
Payer: COMMERCIAL

## 2022-09-23 DIAGNOSIS — N83.209 HEMORRHAGIC OVARIAN CYST: ICD-10-CM

## 2022-09-23 DIAGNOSIS — Z13.29 SCREENING FOR THYROID DISORDER: ICD-10-CM

## 2022-09-23 DIAGNOSIS — R73.01 ELEVATED FASTING GLUCOSE: ICD-10-CM

## 2022-09-23 DIAGNOSIS — D50.9 IRON DEFICIENCY ANEMIA, UNSPECIFIED IRON DEFICIENCY ANEMIA TYPE: ICD-10-CM

## 2022-09-23 LAB
ALBUMIN SERPL BCP-MCNC: 4.2 G/DL (ref 3.5–5)
ALP SERPL-CCNC: 51 U/L (ref 46–116)
ALT SERPL W P-5'-P-CCNC: 40 U/L (ref 12–78)
ANION GAP SERPL CALCULATED.3IONS-SCNC: 6 MMOL/L (ref 4–13)
AST SERPL W P-5'-P-CCNC: 21 U/L (ref 5–45)
BILIRUB SERPL-MCNC: 0.68 MG/DL (ref 0.2–1)
BUN SERPL-MCNC: 10 MG/DL (ref 5–25)
CALCIUM SERPL-MCNC: 9.2 MG/DL (ref 8.3–10.1)
CHLORIDE SERPL-SCNC: 103 MMOL/L (ref 96–108)
CO2 SERPL-SCNC: 25 MMOL/L (ref 21–32)
CREAT SERPL-MCNC: 0.91 MG/DL (ref 0.6–1.3)
EST. AVERAGE GLUCOSE BLD GHB EST-MCNC: 94 MG/DL
FERRITIN SERPL-MCNC: 63 NG/ML (ref 8–388)
GFR SERPL CREATININE-BSD FRML MDRD: 80 ML/MIN/1.73SQ M
GLUCOSE P FAST SERPL-MCNC: 98 MG/DL (ref 65–99)
HBA1C MFR BLD: 4.9 %
IRON SATN MFR SERPL: 24 % (ref 15–50)
IRON SERPL-MCNC: 72 UG/DL (ref 50–170)
POTASSIUM SERPL-SCNC: 4 MMOL/L (ref 3.5–5.3)
PROT SERPL-MCNC: 7.5 G/DL (ref 6.4–8.4)
SODIUM SERPL-SCNC: 134 MMOL/L (ref 135–147)
TIBC SERPL-MCNC: 303 UG/DL (ref 250–450)
TSH SERPL DL<=0.05 MIU/L-ACNC: 2.61 UIU/ML (ref 0.45–4.5)

## 2022-09-23 PROCEDURE — 84443 ASSAY THYROID STIM HORMONE: CPT

## 2022-09-23 PROCEDURE — 76856 US EXAM PELVIC COMPLETE: CPT

## 2022-09-23 PROCEDURE — 83540 ASSAY OF IRON: CPT

## 2022-09-23 PROCEDURE — 83550 IRON BINDING TEST: CPT

## 2022-09-23 PROCEDURE — 76830 TRANSVAGINAL US NON-OB: CPT

## 2022-09-23 PROCEDURE — 82728 ASSAY OF FERRITIN: CPT

## 2022-09-23 PROCEDURE — 83036 HEMOGLOBIN GLYCOSYLATED A1C: CPT

## 2022-09-23 PROCEDURE — 80053 COMPREHEN METABOLIC PANEL: CPT

## 2022-09-23 PROCEDURE — 36415 COLL VENOUS BLD VENIPUNCTURE: CPT

## 2022-09-27 DIAGNOSIS — D50.9 IRON DEFICIENCY ANEMIA, UNSPECIFIED IRON DEFICIENCY ANEMIA TYPE: Primary | ICD-10-CM

## 2022-10-04 ENCOUNTER — DOCUMENTATION (OUTPATIENT)
Dept: LABOR AND DELIVERY | Facility: HOSPITAL | Age: 37
End: 2022-10-04

## 2022-10-04 DIAGNOSIS — D25.1 INTRAMURAL LEIOMYOMA OF UTERUS: Primary | ICD-10-CM

## 2022-12-05 ENCOUNTER — HOSPITAL ENCOUNTER (OUTPATIENT)
Dept: ULTRASOUND IMAGING | Facility: MEDICAL CENTER | Age: 37
Discharge: HOME | End: 2022-12-05

## 2022-12-05 DIAGNOSIS — D25.1 INTRAMURAL LEIOMYOMA OF UTERUS: ICD-10-CM

## 2022-12-30 ENCOUNTER — APPOINTMENT (OUTPATIENT)
Dept: LAB | Facility: CLINIC | Age: 37
End: 2022-12-30

## 2022-12-30 DIAGNOSIS — D50.9 IRON DEFICIENCY ANEMIA, UNSPECIFIED IRON DEFICIENCY ANEMIA TYPE: ICD-10-CM

## 2022-12-30 LAB
BASOPHILS # BLD AUTO: 0.05 THOUSANDS/ÂΜL (ref 0–0.1)
BASOPHILS NFR BLD AUTO: 1 % (ref 0–1)
EOSINOPHIL # BLD AUTO: 0.37 THOUSAND/ÂΜL (ref 0–0.61)
EOSINOPHIL NFR BLD AUTO: 6 % (ref 0–6)
ERYTHROCYTE [DISTWIDTH] IN BLOOD BY AUTOMATED COUNT: 13.2 % (ref 11.6–15.1)
FERRITIN SERPL-MCNC: 49 NG/ML (ref 8–388)
HCT VFR BLD AUTO: 43.1 % (ref 34.8–46.1)
HGB BLD-MCNC: 13.4 G/DL (ref 11.5–15.4)
IMM GRANULOCYTES # BLD AUTO: 0.02 THOUSAND/UL (ref 0–0.2)
IMM GRANULOCYTES NFR BLD AUTO: 0 % (ref 0–2)
IRON SATN MFR SERPL: 30 % (ref 15–50)
IRON SERPL-MCNC: 117 UG/DL (ref 50–170)
LYMPHOCYTES # BLD AUTO: 1.4 THOUSANDS/ÂΜL (ref 0.6–4.47)
LYMPHOCYTES NFR BLD AUTO: 21 % (ref 14–44)
MCH RBC QN AUTO: 30 PG (ref 26.8–34.3)
MCHC RBC AUTO-ENTMCNC: 31.1 G/DL (ref 31.4–37.4)
MCV RBC AUTO: 96 FL (ref 82–98)
MONOCYTES # BLD AUTO: 0.47 THOUSAND/ÂΜL (ref 0.17–1.22)
MONOCYTES NFR BLD AUTO: 7 % (ref 4–12)
NEUTROPHILS # BLD AUTO: 4.37 THOUSANDS/ÂΜL (ref 1.85–7.62)
NEUTS SEG NFR BLD AUTO: 65 % (ref 43–75)
NRBC BLD AUTO-RTO: 0 /100 WBCS
PLATELET # BLD AUTO: 277 THOUSANDS/UL (ref 149–390)
PMV BLD AUTO: 10.3 FL (ref 8.9–12.7)
RBC # BLD AUTO: 4.47 MILLION/UL (ref 3.81–5.12)
TIBC SERPL-MCNC: 396 UG/DL (ref 250–450)
WBC # BLD AUTO: 6.68 THOUSAND/UL (ref 4.31–10.16)

## 2023-04-03 ENCOUNTER — TELEPHONE (OUTPATIENT)
Dept: FAMILY MEDICINE CLINIC | Facility: CLINIC | Age: 38
End: 2023-04-03

## 2023-04-03 NOTE — TELEPHONE ENCOUNTER
Pt made physical appt 4/17 is asking for lab work (Tanja Ferreira)      Please call when orders are placed  [phone]

## 2023-04-04 DIAGNOSIS — Z13.29 SCREENING FOR THYROID DISORDER: ICD-10-CM

## 2023-04-04 DIAGNOSIS — Z00.00 ANNUAL PHYSICAL EXAM: ICD-10-CM

## 2023-04-04 DIAGNOSIS — D50.9 IRON DEFICIENCY ANEMIA, UNSPECIFIED IRON DEFICIENCY ANEMIA TYPE: Primary | ICD-10-CM

## 2023-04-17 PROBLEM — Z87.59 STATUS POST VACUUM-ASSISTED VAGINAL DELIVERY: Status: RESOLVED | Noted: 2018-08-19 | Resolved: 2023-04-17

## 2023-04-17 PROBLEM — E87.1 HYPONATREMIA: Status: ACTIVE | Noted: 2023-04-17

## 2023-04-28 ENCOUNTER — APPOINTMENT (OUTPATIENT)
Dept: LAB | Facility: CLINIC | Age: 38
End: 2023-04-28

## 2023-04-28 DIAGNOSIS — E87.1 HYPONATREMIA: ICD-10-CM

## 2023-04-28 LAB
ANION GAP SERPL CALCULATED.3IONS-SCNC: 4 MMOL/L (ref 4–13)
BUN SERPL-MCNC: 10 MG/DL (ref 5–25)
CALCIUM SERPL-MCNC: 9.2 MG/DL (ref 8.3–10.1)
CHLORIDE SERPL-SCNC: 106 MMOL/L (ref 96–108)
CO2 SERPL-SCNC: 26 MMOL/L (ref 21–32)
CREAT SERPL-MCNC: 0.84 MG/DL (ref 0.6–1.3)
GFR SERPL CREATININE-BSD FRML MDRD: 89 ML/MIN/1.73SQ M
GLUCOSE P FAST SERPL-MCNC: 101 MG/DL (ref 65–99)
POTASSIUM SERPL-SCNC: 4 MMOL/L (ref 3.5–5.3)
SODIUM SERPL-SCNC: 136 MMOL/L (ref 135–147)

## 2023-05-02 DIAGNOSIS — E04.1 THYROID CYST: Primary | ICD-10-CM

## 2023-05-02 DIAGNOSIS — D50.9 IRON DEFICIENCY ANEMIA, UNSPECIFIED IRON DEFICIENCY ANEMIA TYPE: ICD-10-CM

## 2023-05-17 ENCOUNTER — HOSPITAL ENCOUNTER (OUTPATIENT)
Dept: ULTRASOUND IMAGING | Facility: MEDICAL CENTER | Age: 38
Discharge: HOME | End: 2023-05-17

## 2023-05-17 DIAGNOSIS — E04.1 THYROID CYST: ICD-10-CM

## 2023-05-24 DIAGNOSIS — D50.9 IRON DEFICIENCY ANEMIA, UNSPECIFIED IRON DEFICIENCY ANEMIA TYPE: ICD-10-CM

## 2023-05-24 RX ORDER — FERROUS GLUCONATE 324(37.5)
324 TABLET ORAL
Qty: 180 TABLET | Refills: 0 | Status: SHIPPED | OUTPATIENT
Start: 2023-05-24

## 2023-07-05 NOTE — PROGRESS NOTES
Assessment        Diagnoses and all orders for this visit:    Encntr for gyn exam (general) (routine) w/o abn findings    Subserous leiomyoma of uterus             Plan       All questions answered. Contraception: condoms. Follow up in 1 year. Follow up as needed. Monitor periods - order US if needed is potting continues/bleeding in between periods  Declines TXA  PAP deferred  Mammo at age 36  Continue to track cycles    Subjective      Shira Sun is a 45 y.o. female who presents for annual exam.      Chief Complaint   Patient presents with   • Gynecologic Exam     Pt reports no concerns       H/o fibroids and anemia  Has not had to use TXA  She reports periods have improved  Periods have been 6 days long. She has some spotting 3 days before period.    hgb 14.5    Last Pap: 6/10/22  HPV vaccine completed:no  Current contraception: condoms  History of abnormal Pap smear: no  History of abnormal mammogram: no  Family history of uterine or ovarian cancer: no  Family history of breast cancer: no  Family history of colon cancer: no       OB History    Para Term  AB Living   2 2 2 0 0 2   SAB IAB Ectopic Multiple Live Births   0 0 0 0 2      # Outcome Date GA Lbr Jeff/2nd Weight Sex Delivery Anes PTL Lv   2 Term 18 40w2d 03:58 / 00:23 3317 g (7 lb 5 oz) M Vag-Vacuum None N AARON      Name: Sadiq Dennis  ([address] [place] [address])      Gabby Remedies: 8  Apgar5: 9   1 Term 07/19/15 38w2d    Vag-Vacuum   AARON       Menstrual History:  OB History        2    Para   2    Term   2       0    AB   0    Living   2       SAB        IAB        Ectopic        Multiple   0    Live Births   2                Menarche age: 15  Patient's last menstrual period was 06/15/2023 (exact date). Past Medical History:   Diagnosis Date   • Allergic     - seasonal allergies   • Anemia    • Anxiety    • Pregnancy     x2     History reviewed. No pertinent surgical history.   Family History   Problem Relation Age of Onset • Thyroid cancer Mother    • Diabetes Father    • Asthma Brother    • No Known Problems Maternal Grandmother    • Heart disease Maternal Grandfather        Social History     Tobacco Use   • Smoking status: Never   • Smokeless tobacco: Never   Vaping Use   • Vaping Use: Never used   Substance Use Topics   • Alcohol use: Yes     Comment: social   • Drug use: No          Current Outpatient Medications:   •  ferrous gluconate 324 (37.5 Fe) mg, Take 1 tablet (324 mg total) by mouth 2 (two) times a day before meals, Disp: 180 tablet, Rfl: 0  •  Multiple Vitamin (multivitamin) tablet, Take 1 tablet by mouth daily Pre ruperto - no iron, Disp: , Rfl:   •  Tranexamic Acid 650 MG TABS, Take 2 tablets (1,300 mg total) by mouth 3 (three) times a day as needed (heavy menses), Disp: 30 tablet, Rfl: 6    Allergies   Allergen Reactions   • No Active Allergies            Review of Systems   Constitutional: Negative. HENT: Negative. Eyes: Negative. Respiratory: Negative. Cardiovascular: Negative. Gastrointestinal: Negative. Endocrine: Negative. Genitourinary:        As noted in HPI   Musculoskeletal: Negative. Skin: Negative. Allergic/Immunologic: Negative. Neurological: Negative. Hematological: Negative. Psychiatric/Behavioral: Negative. /74 (BP Location: Right arm, Patient Position: Sitting, Cuff Size: Adult)   Pulse 81   Temp (!) 97.2 °F (36.2 °C) (Tympanic)   Ht 5' 6" (1.676 m)   Wt 64.3 kg (141 lb 12.8 oz)   LMP 06/15/2023 (Exact Date)   BMI 22.89 kg/m²         Physical Exam  Constitutional:       Appearance: She is well-developed. Genitourinary:      Vulva, bladder and rectum normal.      No lesions in the vagina. Genitourinary Comments:         Right Labia: No rash, tenderness, lesions, skin changes or Bartholin's cyst.     Left Labia: No tenderness, lesions, skin changes, Bartholin's cyst or rash. No inguinal adenopathy present in the right or left side.      No vaginal discharge, tenderness or bleeding. No vaginal prolapse present. No vaginal atrophy present. Right Adnexa: not tender, not full and no mass present. Left Adnexa: not tender, not full and no mass present. No cervical motion tenderness, friability, lesion or polyp. Uterus is not enlarged or tender. Pelvic exam was performed with patient in the lithotomy position. Rectum:      No external hemorrhoid. Breasts:     Right: No mass, nipple discharge, skin change or tenderness. Left: No mass, nipple discharge, skin change or tenderness. HENT:      Head: Normocephalic. Nose: Nose normal.   Eyes:      Conjunctiva/sclera: Conjunctivae normal.   Neck:      Thyroid: No thyromegaly. Cardiovascular:      Rate and Rhythm: Normal rate and regular rhythm. Heart sounds: Normal heart sounds. No murmur heard. Pulmonary:      Effort: Pulmonary effort is normal. No respiratory distress. Breath sounds: Normal breath sounds. No wheezing or rales. Abdominal:      General: There is no distension. Palpations: Abdomen is soft. There is no mass. Tenderness: There is no abdominal tenderness. There is no guarding or rebound. Musculoskeletal:         General: No tenderness. Cervical back: Neck supple. No muscular tenderness. Lymphadenopathy:      Cervical: No cervical adenopathy. Lower Body: No right inguinal adenopathy. No left inguinal adenopathy. Neurological:      Mental Status: She is alert and oriented to person, place, and time. Skin:     General: Skin is warm and dry. Psychiatric:         Mood and Affect: Mood normal.         Behavior: Behavior normal.             No future appointments.

## 2023-07-05 NOTE — PATIENT INSTRUCTIONS
Wellness Visit for Adults   AMBULATORY CARE:   A wellness visit  is when you see your healthcare provider to get screened for health problems. Your healthcare provider will also give you advice on how to stay healthy. Write down your questions so you remember to ask them. Ask your healthcare provider how often you should have a wellness visit. What happens at a wellness visit:  Your healthcare provider will ask about your health, and your family history of health problems. This includes high blood pressure, heart disease, and cancer. He or she will ask if you have symptoms that concern you, if you smoke, and about your mood. You may also be asked about your intake of medicines, supplements, food, and alcohol. Any of the following may be done: Your weight  will be checked. Your height may also be checked so your body mass index (BMI) can be calculated. Your BMI shows if you are at a healthy weight. Your blood pressure  and heart rate will be checked. Your temperature may also be checked. Blood and urine tests  may be done. Blood tests may be done to check your cholesterol levels. Abnormal cholesterol levels increase your risk for heart disease and stroke. You may also need a blood or urine test to check for diabetes if you are at increased risk. Urine tests may be done to look for signs of an infection or kidney disease. A physical exam  includes checking your heartbeat and lungs with a stethoscope. Your healthcare provider may also check your skin to look for sun damage. Screening tests  may be recommended. A screening test is done to check for diseases that may not cause symptoms. The screening tests you may need depend on your age, gender, family history, and lifestyle habits. For example, colorectal screening may be recommended if you are 48years old or older. Screening tests you need if you are a woman:   A Pap smear  is used to screen for cervical cancer.  Pap smears are usually done every 3 to 5 years depending on your age. You may need them more often if you have had abnormal Pap smear test results in the past. Ask your healthcare provider how often you should have a Pap smear. A mammogram  is an x-ray of your breasts to screen for breast cancer. Experts recommend mammograms every 2 years starting at age 48 years. You may need a mammogram at age 52 years or younger if you have an increased risk for breast cancer. Talk to your healthcare provider about when you should start having mammograms and how often you need them. Vaccines you may need:   Get an influenza vaccine  every year. The influenza vaccine protects you from the flu. Several types of viruses cause the flu. The viruses change over time, so new vaccines are made each year. Get a tetanus-diphtheria (Td) booster vaccine  every 10 years. This vaccine protects you against tetanus and diphtheria. Tetanus is a severe infection that may cause painful muscle spasms and lockjaw. Diphtheria is a severe bacterial infection that causes a thick covering in the back of your mouth and throat. Get a human papillomavirus (HPV) vaccine  if you are female and aged 23 to 32 or male 23 to 24 and never received it. This vaccine protects you from HPV infection. HPV is the most common infection spread by sexual contact. HPV may also cause vaginal, penile, and anal cancers. Get a pneumococcal vaccine  if you are aged 72 years or older. The pneumococcal vaccine is an injection given to protect you from pneumococcal disease. Pneumococcal disease is an infection caused by pneumococcal bacteria. The infection may cause pneumonia, meningitis, or an ear infection. Get a shingles vaccine  if you are 60 or older, even if you have had shingles before. The shingles vaccine is an injection to protect you from the varicella-zoster virus. This is the same virus that causes chickenpox.  Shingles is a painful rash that develops in people who had chickenpox or have been exposed to the virus. How to eat healthy:  My Plate is a model for planning healthy meals. It shows the types and amounts of foods that should go on your plate. Fruits and vegetables make up about half of your plate, and grains and protein make up the other half. A serving of dairy is included on the side of your plate. The amount of calories and serving sizes you need depends on your age, gender, weight, and height. Examples of healthy foods are listed below:  Eat a variety of vegetables  such as dark green, red, and orange vegetables. You can also include canned vegetables low in sodium (salt) and frozen vegetables without added butter or sauces. Eat a variety of fresh fruits , canned fruit in 100% juice, frozen fruit, and dried fruit. Include whole grains. At least half of the grains you eat should be whole grains. Examples include whole-wheat bread, wheat pasta, brown rice, and whole-grain cereals such as oatmeal.    Eat a variety of protein foods such as seafood (fish and shellfish), lean meat, and poultry without skin (turkey and chicken). Examples of lean meats include pork leg, shoulder, or tenderloin, and beef round, sirloin, tenderloin, and extra lean ground beef. Other protein foods include eggs and egg substitutes, beans, peas, soy products, nuts, and seeds. Choose low-fat dairy products such as skim or 1% milk or low-fat yogurt, cheese, and cottage cheese. Limit unhealthy fats  such as butter, hard margarine, and shortening. Exercise:  Exercise at least 30 minutes per day on most days of the week. Some examples of exercise include walking, biking, dancing, and swimming. You can also fit in more physical activity by taking the stairs instead of the elevator or parking farther away from stores. Include muscle strengthening activities 2 days each week. Regular exercise provides many health benefits.  It helps you manage your weight, and decreases your risk for type 2 diabetes, heart disease, stroke, and high blood pressure. Exercise can also help improve your mood. Ask your healthcare provider about the best exercise plan for you. General health and safety guidelines:   Do not smoke. Nicotine and other chemicals in cigarettes and cigars can cause lung damage. Ask your healthcare provider for information if you currently smoke and need help to quit. E-cigarettes or smokeless tobacco still contain nicotine. Talk to your healthcare provider before you use these products. Limit alcohol. A drink of alcohol is 12 ounces of beer, 5 ounces of wine, or 1½ ounces of liquor. Lose weight, if needed. Being overweight increases your risk of certain health conditions. These include heart disease, high blood pressure, type 2 diabetes, and certain types of cancer. Protect your skin. Do not sunbathe or use tanning beds. Use sunscreen with a SPF 15 or higher. Apply sunscreen at least 15 minutes before you go outside. Reapply sunscreen every 2 hours. Wear protective clothing, hats, and sunglasses when you are outside. Drive safely. Always wear your seatbelt. Make sure everyone in your car wears a seatbelt. A seatbelt can save your life if you are in an accident. Do not use your cell phone when you are driving. This could distract you and cause an accident. Pull over if you need to make a call or send a text message. Practice safe sex. Use latex condoms if are sexually active and have more than one partner. Your healthcare provider may recommend screening tests for sexually transmitted infections (STIs). Wear helmets, lifejackets, and protective gear. Always wear a helmet when you ride a bike or motorcycle, go skiing, or play sports that could cause a head injury. Wear protective equipment when you play sports. Wear a lifejacket when you are on a boat or doing water sports.     © Copyright [employer] 2022 Information is for End User's use only and may not be sold, redistributed or otherwise used for commercial purposes. The above information is an  only. It is not intended as medical advice for individual conditions or treatments. Talk to your doctor, nurse or pharmacist before following any medical regimen to see if it is safe and effective for you.

## 2023-07-07 ENCOUNTER — ANNUAL EXAM (OUTPATIENT)
Dept: OBGYN CLINIC | Facility: CLINIC | Age: 38
End: 2023-07-07
Payer: COMMERCIAL

## 2023-07-07 VITALS
DIASTOLIC BLOOD PRESSURE: 74 MMHG | BODY MASS INDEX: 22.79 KG/M2 | TEMPERATURE: 97.2 F | HEIGHT: 66 IN | SYSTOLIC BLOOD PRESSURE: 112 MMHG | HEART RATE: 81 BPM | WEIGHT: 141.8 LBS

## 2023-07-07 DIAGNOSIS — D25.2 SUBSEROUS LEIOMYOMA OF UTERUS: ICD-10-CM

## 2023-07-07 DIAGNOSIS — Z01.419 ENCNTR FOR GYN EXAM (GENERAL) (ROUTINE) W/O ABN FINDINGS: Primary | ICD-10-CM

## 2023-07-07 PROCEDURE — S0612 ANNUAL GYNECOLOGICAL EXAMINA: HCPCS | Performed by: OBSTETRICS & GYNECOLOGY

## 2023-08-10 ENCOUNTER — APPOINTMENT (OUTPATIENT)
Dept: LAB | Facility: CLINIC | Age: 38
End: 2023-08-10
Payer: COMMERCIAL

## 2023-08-10 DIAGNOSIS — D50.9 IRON DEFICIENCY ANEMIA, UNSPECIFIED IRON DEFICIENCY ANEMIA TYPE: ICD-10-CM

## 2023-08-10 LAB
ANION GAP SERPL CALCULATED.3IONS-SCNC: 6 MMOL/L
BUN SERPL-MCNC: 14 MG/DL (ref 5–25)
CALCIUM SERPL-MCNC: 10 MG/DL (ref 8.3–10.1)
CHLORIDE SERPL-SCNC: 105 MMOL/L (ref 96–108)
CO2 SERPL-SCNC: 27 MMOL/L (ref 21–32)
CREAT SERPL-MCNC: 0.94 MG/DL (ref 0.6–1.3)
FERRITIN SERPL-MCNC: 51 NG/ML (ref 11–307)
GFR SERPL CREATININE-BSD FRML MDRD: 77 ML/MIN/1.73SQ M
GLUCOSE P FAST SERPL-MCNC: 102 MG/DL (ref 65–99)
IRON SATN MFR SERPL: 34 % (ref 15–50)
IRON SERPL-MCNC: 117 UG/DL (ref 50–170)
POTASSIUM SERPL-SCNC: 4.6 MMOL/L (ref 3.5–5.3)
SODIUM SERPL-SCNC: 138 MMOL/L (ref 135–147)
TIBC SERPL-MCNC: 343 UG/DL (ref 250–450)

## 2023-08-10 PROCEDURE — 80048 BASIC METABOLIC PNL TOTAL CA: CPT

## 2023-08-10 PROCEDURE — 82728 ASSAY OF FERRITIN: CPT

## 2023-08-10 PROCEDURE — 83550 IRON BINDING TEST: CPT

## 2023-08-10 PROCEDURE — 83540 ASSAY OF IRON: CPT

## 2023-08-10 PROCEDURE — 36415 COLL VENOUS BLD VENIPUNCTURE: CPT

## 2023-08-16 DIAGNOSIS — E53.8 VITAMIN B12 DEFICIENCY: ICD-10-CM

## 2023-08-16 DIAGNOSIS — E55.9 VITAMIN D DEFICIENCY: Primary | ICD-10-CM

## 2023-09-05 DIAGNOSIS — D50.9 IRON DEFICIENCY ANEMIA, UNSPECIFIED IRON DEFICIENCY ANEMIA TYPE: ICD-10-CM

## 2023-09-06 RX ORDER — FERROUS GLUCONATE 324(37.5)
324 TABLET ORAL
Qty: 180 TABLET | Refills: 0 | Status: SHIPPED | OUTPATIENT
Start: 2023-09-06

## 2023-11-17 DIAGNOSIS — D50.9 IRON DEFICIENCY ANEMIA, UNSPECIFIED IRON DEFICIENCY ANEMIA TYPE: ICD-10-CM

## 2023-11-17 NOTE — TELEPHONE ENCOUNTER
Patient requesting lab order and refill Rx: Iron medication. Patient has been made aware she must schedule an establish care with one of the PCP here at the office prior.     Patient scheduled for 12/27/23

## 2023-11-19 RX ORDER — FERROUS GLUCONATE 324(37.5)
324 TABLET ORAL
Qty: 180 TABLET | Refills: 0 | Status: SHIPPED | OUTPATIENT
Start: 2023-11-19

## 2023-12-14 ENCOUNTER — TELEPHONE (OUTPATIENT)
Dept: PEDIATRICS CLINIC | Facility: CLINIC | Age: 38
End: 2023-12-14

## 2023-12-19 ENCOUNTER — TELEMEDICINE (OUTPATIENT)
Dept: FAMILY MEDICINE CLINIC | Facility: CLINIC | Age: 38
End: 2023-12-19
Payer: COMMERCIAL

## 2023-12-19 ENCOUNTER — TELEPHONE (OUTPATIENT)
Dept: FAMILY MEDICINE CLINIC | Facility: CLINIC | Age: 38
End: 2023-12-19

## 2023-12-19 DIAGNOSIS — U07.1 COVID-19: Primary | ICD-10-CM

## 2023-12-19 PROCEDURE — 99213 OFFICE O/P EST LOW 20 MIN: CPT | Performed by: FAMILY MEDICINE

## 2023-12-19 NOTE — PROGRESS NOTES
COVID-19 Outpatient Progress Note    Assessment/Plan:    Problem List Items Addressed This Visit    None  Visit Diagnoses       COVID-19    -  Primary           Disposition:     Patient with asymptomatic/mild COVID-19: They were recommended to isolate for at least 5 days (day 0 is the day symptoms appeared or the date the specimen was collected for the positive test for people who are asymptomatic). If they are asymptomatic or symptoms are improving with no fevers in the past 24 hours, isolation may be ended followed by 5 days of wearing a high quality mask when around others to minimize risk of infecting others. They should wear a mask through day 10 and a test-based strategy may be used to remove a mask sooner.      Discussed symptom directed medication options with patient. Discussed vitamin D, vitamin C, and/or zinc supplementation with patient.     I have spent a total time of 10 minutes on the day of the encounter for this patient including discussing diagnostic results, discussing prognosis, risks and benefits of treatment options, instructions for management, patient and family education, importance of treatment compliance, risk factor reductions and impressions.       Encounter provider: Brigitte Johnson DO     Provider located at: [facility]  [address]  [facility] [address] [phone]     Recent Visits  No visits were found meeting these conditions.  Showing recent visits within past 7 days and meeting all other requirements  Today's Visits  Date Type Provider Dept   12/19/23 Telemedicine Brigitte Johnson DO [facility]   12/19/23 Telephone Kaya Dennison [facility]   Showing today's visits and meeting all other requirements  Future Appointments  No visits were found meeting these conditions.  Showing future appointments within next 150 days and meeting all other requirements     Subjective:   Tamika Sadler is a 38 y.o.  "female who has been screened for COVID-19. Symptom change since last report: unchanged. Patient's symptoms include fever, fatigue, malaise, nasal congestion, rhinorrhea, sore throat, cough and headache. Patient denies chills, shortness of breath, chest tightness, abdominal pain, nausea, diarrhea and myalgias.     - Date of symptom onset: 12/15/2023  - Date of positive COVID-19 test: 12/19/2023. Type of test: Home antigen.     COVID-19 vaccination status: Fully vaccinated with booster    Tamika has been staying home and has isolated themselves in her home. She is taking care to not share personal items and is cleaning all surfaces that are touched often, like counters, tabletops, and doorknobs using household cleaning sprays or wipes. She is wearing a mask when she leaves her room.     No results found for: \"SARSCOV2\", \"[id number]\", \"SARSCORONAVI\", \"CORONAVIRUSR\", \"SARSCOVAG\", \"SARSCOVAGH\"    Review of Systems   Constitutional:  Positive for fatigue and fever. Negative for activity change and chills.   HENT:  Positive for congestion, rhinorrhea and sore throat. Negative for ear pain and sinus pressure.    Eyes:  Negative for redness, itching and visual disturbance.   Respiratory:  Positive for cough. Negative for chest tightness and shortness of breath.    Cardiovascular:  Negative for chest pain and palpitations.   Gastrointestinal:  Negative for abdominal pain, diarrhea and nausea.   Endocrine: Negative for cold intolerance and heat intolerance.   Genitourinary:  Negative for dysuria, flank pain and frequency.   Musculoskeletal:  Negative for arthralgias, back pain, gait problem and myalgias.   Skin:  Negative for color change.   Allergic/Immunologic: Negative for environmental allergies.   Neurological:  Positive for headaches. Negative for dizziness and numbness.   Psychiatric/Behavioral:  Negative for behavioral problems and sleep disturbance.      Current Outpatient Medications on File Prior to Visit "   Medication Sig    ferrous gluconate 324 (37.5 Fe) mg Take 1 tablet (324 mg total) by mouth 2 (two) times a day before meals    Multiple Vitamin (multivitamin) tablet Take 1 tablet by mouth daily Pre ruperto - no iron    Tranexamic Acid 650 MG TABS Take 2 tablets (1,300 mg total) by mouth 3 (three) times a day as needed (heavy menses)       Objective:    There were no vitals taken for this visit.       Physical Exam  Constitutional:       General: She is not in acute distress.     Appearance: She is well-developed. She is not ill-appearing or toxic-appearing.   HENT:      Head: Normocephalic and atraumatic.      Right Ear: Hearing normal.      Left Ear: Hearing normal.      Nose: Nose normal.   Eyes:      General: Lids are normal.      Conjunctiva/sclera: Conjunctivae normal.   Pulmonary:      Effort: Pulmonary effort is normal. No respiratory distress.   Musculoskeletal:         General: Normal range of motion.      Cervical back: Normal range of motion.   Skin:     General: Skin is dry.      Findings: No erythema or rash.   Neurological:      Mental Status: She is alert and oriented to person, place, and time.      GCS: GCS eye subscore is 4. GCS verbal subscore is 5. GCS motor subscore is 6.   Psychiatric:         Behavior: Behavior normal.         Thought Content: Thought content normal.         Judgment: Judgment normal.       Brigitte Johnson DO

## 2023-12-19 NOTE — TELEPHONE ENCOUNTER
Hi, this is Tamika Sadler's Birth date is [birth year]. I'm just calling I guess to get some guidance. I believe that I have COVID. I've been having symptoms since Friday and I just. I took a test on Saturday that was negative, but today I took a Binex antigen test that had a faint positive. But then I also checked it with a flow flow flex test that came up negative. So I guess I'm just asking what I should go here, how I should proceed from here, if it would be a PCR test, or what else I would need to do if you could give me a call back, My number is [phone]. Thank you.      Please advise as on call provider.

## 2023-12-27 ENCOUNTER — OFFICE VISIT (OUTPATIENT)
Dept: FAMILY MEDICINE CLINIC | Facility: CLINIC | Age: 38
End: 2023-12-27
Payer: COMMERCIAL

## 2023-12-27 VITALS
SYSTOLIC BLOOD PRESSURE: 110 MMHG | HEIGHT: 67 IN | HEART RATE: 76 BPM | WEIGHT: 137.2 LBS | BODY MASS INDEX: 21.53 KG/M2 | OXYGEN SATURATION: 99 % | DIASTOLIC BLOOD PRESSURE: 76 MMHG | TEMPERATURE: 97.5 F

## 2023-12-27 DIAGNOSIS — E53.8 VITAMIN B12 DEFICIENCY: ICD-10-CM

## 2023-12-27 DIAGNOSIS — E55.9 VITAMIN D DEFICIENCY: ICD-10-CM

## 2023-12-27 DIAGNOSIS — D50.9 IRON DEFICIENCY ANEMIA, UNSPECIFIED IRON DEFICIENCY ANEMIA TYPE: Primary | ICD-10-CM

## 2023-12-27 PROCEDURE — 99213 OFFICE O/P EST LOW 20 MIN: CPT | Performed by: FAMILY MEDICINE

## 2023-12-27 RX ORDER — FERROUS GLUCONATE 324(37.5)
324 TABLET ORAL
Qty: 180 TABLET | Refills: 1 | Status: SHIPPED | OUTPATIENT
Start: 2023-12-27

## 2023-12-27 NOTE — PROGRESS NOTES
"Assessment/Plan:     1. Iron deficiency anemia, unspecified iron deficiency anemia type  Assessment & Plan:  C/w iron supplementation; update labs; f/u guidance given    Orders:  -     CBC and differential; Future  -     Iron Panel (Includes Ferritin, Iron Sat%, Iron, and TIBC); Future  -     Vitamin B12; Future  -     ferrous gluconate 324 (37.5 Fe) mg; Take 1 tablet (324 mg total) by mouth 2 (two) times a day before meals    2. Vitamin D deficiency  -     Vitamin D 25 hydroxy; Future    3. Vitamin B12 deficiency  -     Vitamin B12; Future          Subjective:      Patient ID: Tamika Sadler is a 38 y.o. female.    Patient states she has history of anemia. She has been on iron supplements for about 1 year. Does have hx of heavy menses. Patient states period used to be a lot heavier after pregnancy. Has not had to use tranexamic acid. Denies any lightheadedness or SOB. Sometimes can tell her iron is low and will feel more run down especially after a period. She states she has been taking an extra iron pill during that time and seems to help.   She did have covid last week so felt a little tired and run down. Feeling better now.         The following portions of the patient's history were reviewed and updated as appropriate: allergies, current medications, past family history, past medical history, past social history, past surgical history, and problem list.    Review of Systems   Constitutional:  Positive for fatigue.   HENT:  Positive for congestion.    Respiratory:  Negative for shortness of breath.    Cardiovascular:  Negative for chest pain and palpitations.   Neurological:  Negative for dizziness and light-headedness.         Objective:      /76 (BP Location: Left arm, Patient Position: Sitting, Cuff Size: Adult)   Pulse 76   Temp 97.5 °F (36.4 °C)   Ht 5' 6.5\" (1.689 m)   Wt 62.2 kg (137 lb 3.2 oz)   LMP 12/18/2023 (Exact Date)   SpO2 99%   BMI 21.81 kg/m²          Physical Exam  Vitals " reviewed.   Constitutional:       General: She is not in acute distress.     Appearance: Normal appearance. She is not ill-appearing, toxic-appearing or diaphoretic.   HENT:      Head: Normocephalic and atraumatic.   Eyes:      General: No scleral icterus.        Right eye: No discharge.         Left eye: No discharge.      Conjunctiva/sclera: Conjunctivae normal.   Cardiovascular:      Rate and Rhythm: Normal rate and regular rhythm.      Pulses: Normal pulses.      Heart sounds: Normal heart sounds. No murmur heard.     No gallop.   Pulmonary:      Effort: Pulmonary effort is normal. No respiratory distress.      Breath sounds: Normal breath sounds. No stridor. No wheezing, rhonchi or rales.   Musculoskeletal:      Right lower leg: No edema.      Left lower leg: No edema.   Neurological:      General: No focal deficit present.      Mental Status: She is alert and oriented to person, place, and time.   Psychiatric:         Mood and Affect: Mood normal.         Behavior: Behavior normal.         Thought Content: Thought content normal.         Judgment: Judgment normal.

## 2024-01-08 ENCOUNTER — APPOINTMENT (OUTPATIENT)
Dept: LAB | Facility: CLINIC | Age: 39
End: 2024-01-08
Payer: COMMERCIAL

## 2024-01-08 DIAGNOSIS — D50.9 IRON DEFICIENCY ANEMIA, UNSPECIFIED IRON DEFICIENCY ANEMIA TYPE: ICD-10-CM

## 2024-01-08 DIAGNOSIS — E53.8 VITAMIN B12 DEFICIENCY: ICD-10-CM

## 2024-01-08 DIAGNOSIS — E55.9 VITAMIN D DEFICIENCY: ICD-10-CM

## 2024-01-08 LAB
25(OH)D3 SERPL-MCNC: 30.5 NG/ML (ref 30–100)
BASOPHILS # BLD AUTO: 0.06 THOUSANDS/ÂΜL (ref 0–0.1)
BASOPHILS NFR BLD AUTO: 2 % (ref 0–1)
EOSINOPHIL # BLD AUTO: 0.17 THOUSAND/ÂΜL (ref 0–0.61)
EOSINOPHIL NFR BLD AUTO: 4 % (ref 0–6)
ERYTHROCYTE [DISTWIDTH] IN BLOOD BY AUTOMATED COUNT: 13.6 % (ref 11.6–15.1)
FERRITIN SERPL-MCNC: 18 NG/ML (ref 11–307)
HCT VFR BLD AUTO: 42.6 % (ref 34.8–46.1)
HGB BLD-MCNC: 13.4 G/DL (ref 11.5–15.4)
IMM GRANULOCYTES # BLD AUTO: 0.01 THOUSAND/UL (ref 0–0.2)
IMM GRANULOCYTES NFR BLD AUTO: 0 % (ref 0–2)
IRON SATN MFR SERPL: 33 % (ref 15–50)
IRON SERPL-MCNC: 130 UG/DL (ref 50–212)
LYMPHOCYTES # BLD AUTO: 1.65 THOUSANDS/ÂΜL (ref 0.6–4.47)
LYMPHOCYTES NFR BLD AUTO: 40 % (ref 14–44)
MCH RBC QN AUTO: 29.6 PG (ref 26.8–34.3)
MCHC RBC AUTO-ENTMCNC: 31.5 G/DL (ref 31.4–37.4)
MCV RBC AUTO: 94 FL (ref 82–98)
MONOCYTES # BLD AUTO: 0.48 THOUSAND/ÂΜL (ref 0.17–1.22)
MONOCYTES NFR BLD AUTO: 12 % (ref 4–12)
NEUTROPHILS # BLD AUTO: 1.74 THOUSANDS/ÂΜL (ref 1.85–7.62)
NEUTS SEG NFR BLD AUTO: 42 % (ref 43–75)
NRBC BLD AUTO-RTO: 0 /100 WBCS
PLATELET # BLD AUTO: 299 THOUSANDS/UL (ref 149–390)
PMV BLD AUTO: 10.9 FL (ref 8.9–12.7)
RBC # BLD AUTO: 4.52 MILLION/UL (ref 3.81–5.12)
TIBC SERPL-MCNC: 400 UG/DL (ref 250–450)
UIBC SERPL-MCNC: 270 UG/DL (ref 155–355)
VIT B12 SERPL-MCNC: 448 PG/ML (ref 180–914)
WBC # BLD AUTO: 4.11 THOUSAND/UL (ref 4.31–10.16)

## 2024-01-08 PROCEDURE — 85025 COMPLETE CBC W/AUTO DIFF WBC: CPT

## 2024-01-08 PROCEDURE — 83550 IRON BINDING TEST: CPT

## 2024-01-08 PROCEDURE — 82607 VITAMIN B-12: CPT

## 2024-01-08 PROCEDURE — 82728 ASSAY OF FERRITIN: CPT

## 2024-01-08 PROCEDURE — 82306 VITAMIN D 25 HYDROXY: CPT

## 2024-01-08 PROCEDURE — 36415 COLL VENOUS BLD VENIPUNCTURE: CPT

## 2024-01-08 PROCEDURE — 83540 ASSAY OF IRON: CPT

## 2024-01-10 ENCOUNTER — PATIENT MESSAGE (OUTPATIENT)
Dept: FAMILY MEDICINE CLINIC | Facility: CLINIC | Age: 39
End: 2024-01-10

## 2024-02-21 PROBLEM — Z01.419 ENCNTR FOR GYN EXAM (GENERAL) (ROUTINE) W/O ABN FINDINGS: Status: RESOLVED | Noted: 2023-07-07 | Resolved: 2024-02-21

## 2024-05-16 ENCOUNTER — TELEPHONE (OUTPATIENT)
Dept: OBGYN CLINIC | Facility: CLINIC | Age: 39
End: 2024-05-16

## 2024-06-06 DIAGNOSIS — R73.01 ELEVATED FASTING GLUCOSE: Primary | ICD-10-CM

## 2024-06-06 DIAGNOSIS — Z13.6 ENCOUNTER FOR LIPID SCREENING FOR CARDIOVASCULAR DISEASE: ICD-10-CM

## 2024-06-06 DIAGNOSIS — Z13.220 ENCOUNTER FOR LIPID SCREENING FOR CARDIOVASCULAR DISEASE: ICD-10-CM

## 2024-06-28 ENCOUNTER — APPOINTMENT (OUTPATIENT)
Dept: LAB | Facility: CLINIC | Age: 39
End: 2024-06-28
Payer: COMMERCIAL

## 2024-06-28 DIAGNOSIS — Z13.220 ENCOUNTER FOR LIPID SCREENING FOR CARDIOVASCULAR DISEASE: ICD-10-CM

## 2024-06-28 DIAGNOSIS — Z13.6 ENCOUNTER FOR LIPID SCREENING FOR CARDIOVASCULAR DISEASE: ICD-10-CM

## 2024-06-28 DIAGNOSIS — D50.9 IRON DEFICIENCY ANEMIA, UNSPECIFIED IRON DEFICIENCY ANEMIA TYPE: ICD-10-CM

## 2024-06-28 DIAGNOSIS — R73.01 ELEVATED FASTING GLUCOSE: ICD-10-CM

## 2024-06-28 LAB
ALBUMIN SERPL BCG-MCNC: 4.3 G/DL (ref 3.5–5)
ALP SERPL-CCNC: 53 U/L (ref 34–104)
ALT SERPL W P-5'-P-CCNC: 31 U/L (ref 7–52)
ANION GAP SERPL CALCULATED.3IONS-SCNC: 8 MMOL/L (ref 4–13)
AST SERPL W P-5'-P-CCNC: 25 U/L (ref 13–39)
BASOPHILS # BLD AUTO: 0.06 THOUSANDS/ÂΜL (ref 0–0.1)
BASOPHILS NFR BLD AUTO: 1 % (ref 0–1)
BILIRUB SERPL-MCNC: 0.75 MG/DL (ref 0.2–1)
BUN SERPL-MCNC: 14 MG/DL (ref 5–25)
CALCIUM SERPL-MCNC: 9.2 MG/DL (ref 8.4–10.2)
CHLORIDE SERPL-SCNC: 101 MMOL/L (ref 96–108)
CHOLEST SERPL-MCNC: 171 MG/DL
CO2 SERPL-SCNC: 29 MMOL/L (ref 21–32)
CREAT SERPL-MCNC: 0.73 MG/DL (ref 0.6–1.3)
EOSINOPHIL # BLD AUTO: 0.27 THOUSAND/ÂΜL (ref 0–0.61)
EOSINOPHIL NFR BLD AUTO: 5 % (ref 0–6)
ERYTHROCYTE [DISTWIDTH] IN BLOOD BY AUTOMATED COUNT: 12.7 % (ref 11.6–15.1)
EST. AVERAGE GLUCOSE BLD GHB EST-MCNC: 100 MG/DL
FERRITIN SERPL-MCNC: 17 NG/ML (ref 11–307)
GFR SERPL CREATININE-BSD FRML MDRD: 104 ML/MIN/1.73SQ M
GLUCOSE P FAST SERPL-MCNC: 94 MG/DL (ref 65–99)
HBA1C MFR BLD: 5.1 %
HCT VFR BLD AUTO: 41.4 % (ref 34.8–46.1)
HDLC SERPL-MCNC: 73 MG/DL
HGB BLD-MCNC: 12.9 G/DL (ref 11.5–15.4)
IMM GRANULOCYTES # BLD AUTO: 0.01 THOUSAND/UL (ref 0–0.2)
IMM GRANULOCYTES NFR BLD AUTO: 0 % (ref 0–2)
IRON SATN MFR SERPL: 28 % (ref 15–50)
IRON SERPL-MCNC: 105 UG/DL (ref 50–212)
LDLC SERPL CALC-MCNC: 85 MG/DL (ref 0–100)
LYMPHOCYTES # BLD AUTO: 1.48 THOUSANDS/ÂΜL (ref 0.6–4.47)
LYMPHOCYTES NFR BLD AUTO: 30 % (ref 14–44)
MCH RBC QN AUTO: 29.5 PG (ref 26.8–34.3)
MCHC RBC AUTO-ENTMCNC: 31.2 G/DL (ref 31.4–37.4)
MCV RBC AUTO: 95 FL (ref 82–98)
MONOCYTES # BLD AUTO: 0.51 THOUSAND/ÂΜL (ref 0.17–1.22)
MONOCYTES NFR BLD AUTO: 10 % (ref 4–12)
NEUTROPHILS # BLD AUTO: 2.68 THOUSANDS/ÂΜL (ref 1.85–7.62)
NEUTS SEG NFR BLD AUTO: 54 % (ref 43–75)
NRBC BLD AUTO-RTO: 0 /100 WBCS
PLATELET # BLD AUTO: 239 THOUSANDS/UL (ref 149–390)
PMV BLD AUTO: 10.1 FL (ref 8.9–12.7)
POTASSIUM SERPL-SCNC: 4.1 MMOL/L (ref 3.5–5.3)
PROT SERPL-MCNC: 6.9 G/DL (ref 6.4–8.4)
RBC # BLD AUTO: 4.37 MILLION/UL (ref 3.81–5.12)
SODIUM SERPL-SCNC: 138 MMOL/L (ref 135–147)
TIBC SERPL-MCNC: 381 UG/DL (ref 250–450)
TRIGL SERPL-MCNC: 63 MG/DL
UIBC SERPL-MCNC: 276 UG/DL (ref 155–355)
WBC # BLD AUTO: 5.01 THOUSAND/UL (ref 4.31–10.16)

## 2024-06-28 PROCEDURE — 80053 COMPREHEN METABOLIC PANEL: CPT

## 2024-06-28 PROCEDURE — 82728 ASSAY OF FERRITIN: CPT

## 2024-06-28 PROCEDURE — 80061 LIPID PANEL: CPT

## 2024-06-28 PROCEDURE — 83550 IRON BINDING TEST: CPT

## 2024-06-28 PROCEDURE — 85025 COMPLETE CBC W/AUTO DIFF WBC: CPT

## 2024-06-28 PROCEDURE — 83540 ASSAY OF IRON: CPT

## 2024-06-28 PROCEDURE — 83036 HEMOGLOBIN GLYCOSYLATED A1C: CPT

## 2024-06-28 PROCEDURE — 36415 COLL VENOUS BLD VENIPUNCTURE: CPT

## 2024-07-02 ENCOUNTER — TELEPHONE (OUTPATIENT)
Dept: FAMILY MEDICINE CLINIC | Facility: CLINIC | Age: 39
End: 2024-07-02

## 2024-07-03 ENCOUNTER — OFFICE VISIT (OUTPATIENT)
Dept: FAMILY MEDICINE CLINIC | Facility: CLINIC | Age: 39
End: 2024-07-03
Payer: COMMERCIAL

## 2024-07-03 VITALS
WEIGHT: 149 LBS | HEIGHT: 66 IN | HEART RATE: 90 BPM | BODY MASS INDEX: 23.95 KG/M2 | TEMPERATURE: 98.2 F | SYSTOLIC BLOOD PRESSURE: 128 MMHG | OXYGEN SATURATION: 98 % | DIASTOLIC BLOOD PRESSURE: 80 MMHG

## 2024-07-03 DIAGNOSIS — E04.1 THYROID CYST: ICD-10-CM

## 2024-07-03 DIAGNOSIS — R41.840 IMPAIRED CONCENTRATION: ICD-10-CM

## 2024-07-03 DIAGNOSIS — Z23 ENCOUNTER FOR IMMUNIZATION: ICD-10-CM

## 2024-07-03 DIAGNOSIS — D50.8 IRON DEFICIENCY ANEMIA SECONDARY TO INADEQUATE DIETARY IRON INTAKE: ICD-10-CM

## 2024-07-03 DIAGNOSIS — Z00.00 ROUTINE ADULT HEALTH MAINTENANCE: Primary | ICD-10-CM

## 2024-07-03 PROCEDURE — 99395 PREV VISIT EST AGE 18-39: CPT | Performed by: FAMILY MEDICINE

## 2024-07-03 NOTE — PROGRESS NOTES
Adult Annual Physical  Name: Tamika Sadler      : 1985      MRN: [id number]  Encounter Provider: Mara Fitzpatrick DO  Encounter Date: 7/3/2024   Encounter department: [facility]    Assessment & Plan   1. Routine adult health maintenance  Assessment & Plan:  Advised on diet and exercise; exam WNL; reviewed preventative measures; pap UTD and reviewed screening recommendations; left before tdap given by mistake can return for nurse visit for this  2. Encounter for immunization  3. Iron deficiency anemia secondary to inadequate dietary iron intake  Assessment & Plan:  Labs WNL; c/w iron supplementation; check celiac  Orders:  -     Celiac Disease Panel; Future  4. Thyroid cyst  Assessment & Plan:  Repeat u/s  Orders:  -     US thyroid; Future; Expected date: 2024  5. Impaired concentration    Immunizations and preventive care screenings were discussed with patient today. Appropriate education was printed on patient's after visit summary.    Counseling:  Dental Health: discussed importance of regular tooth brushing, flossing, and dental visits.  Exercise: the importance of regular exercise/physical activity was discussed. Recommend exercise 3-5 times per week for at least 30 minutes.       Depression Screening and Follow-up Plan: Patient was screened for depression during today's encounter. They screened negative with a PHQ-2 score of 0.        History of Present Illness     Adult Annual Physical:  Patient presents for annual physical.     Diet and Physical Activity:  - Diet/Nutrition: well balanced diet.  - Exercise: walking and 3-4 times a week on average.    Depression Screening:  - PHQ-2 Score: 0    General Health:  - Sleep: sleeps well.  - Hearing: normal hearing bilateral ears.  - Vision: no vision problems.  - Dental: regular dental visits.    /GYN Health:  - Follows with GYN: yes.   - Menopause: premenopausal.   - History of STDs: no  - Contraception: barrier methods.  "     Advanced Care Planning:  - Has an advanced directive?: no    - Has a durable medical POA?: no    - ACP document given to patient?: no      Review of Systems   Constitutional:  Positive for fatigue.   Genitourinary:  Negative for menstrual problem.     Pertinent Medical History         Objective     /80 (BP Location: Left arm, Patient Position: Sitting, Cuff Size: Standard)   Pulse 90   Temp 98.2 °F (36.8 °C)   Ht 5' 5.75\" (1.67 m)   Wt 67.6 kg (149 lb)   LMP 06/20/2024   SpO2 98%   BMI 24.23 kg/m²     Physical Exam  Vitals reviewed.   Constitutional:       General: She is not in acute distress.     Appearance: Normal appearance. She is normal weight. She is not ill-appearing or toxic-appearing.   HENT:      Head: Normocephalic and atraumatic.      Right Ear: Tympanic membrane, ear canal and external ear normal. There is no impacted cerumen.      Left Ear: Tympanic membrane, ear canal and external ear normal. There is no impacted cerumen.      Nose: Nose normal.      Mouth/Throat:      Mouth: Mucous membranes are moist.      Pharynx: No oropharyngeal exudate or posterior oropharyngeal erythema.   Eyes:      General: No scleral icterus.        Left eye: No discharge.      Conjunctiva/sclera: Conjunctivae normal.      Pupils: Pupils are equal, round, and reactive to light.   Neck:      Thyroid: No thyroid mass, thyromegaly or thyroid tenderness.   Cardiovascular:      Rate and Rhythm: Normal rate and regular rhythm.      Heart sounds: Normal heart sounds. No murmur heard.  Pulmonary:      Effort: Pulmonary effort is normal. No respiratory distress.      Breath sounds: Normal breath sounds. No wheezing, rhonchi or rales.   Abdominal:      General: Abdomen is flat.      Palpations: There is no mass.      Tenderness: There is no abdominal tenderness. There is no guarding.      Hernia: No hernia is present.   Musculoskeletal:         General: No swelling.      Cervical back: Neck supple. No muscular " tenderness.   Lymphadenopathy:      Cervical: No cervical adenopathy.   Skin:     Findings: No rash.   Neurological:      Mental Status: She is alert and oriented to person, place, and time.   Psychiatric:         Mood and Affect: Mood normal.         Behavior: Behavior normal.         Thought Content: Thought content normal.         Judgment: Judgment normal.

## 2024-07-03 NOTE — ASSESSMENT & PLAN NOTE
Advised on diet and exercise; exam WNL; reviewed preventative measures; pap UTD and reviewed screening recommendations; left before tdap given by mistake can return for nurse visit for this

## 2024-07-15 DIAGNOSIS — D50.9 IRON DEFICIENCY ANEMIA, UNSPECIFIED IRON DEFICIENCY ANEMIA TYPE: ICD-10-CM

## 2024-07-16 RX ORDER — FERROUS GLUCONATE 324(37.5)
324 TABLET ORAL
Qty: 200 TABLET | Refills: 1 | Status: SHIPPED | OUTPATIENT
Start: 2024-07-16

## 2024-07-22 ENCOUNTER — ANNUAL EXAM (OUTPATIENT)
Dept: OBGYN CLINIC | Facility: CLINIC | Age: 39
End: 2024-07-22
Payer: COMMERCIAL

## 2024-07-22 VITALS
DIASTOLIC BLOOD PRESSURE: 78 MMHG | HEIGHT: 65 IN | HEART RATE: 97 BPM | OXYGEN SATURATION: 100 % | SYSTOLIC BLOOD PRESSURE: 116 MMHG | WEIGHT: 150.6 LBS | BODY MASS INDEX: 25.09 KG/M2

## 2024-07-22 DIAGNOSIS — N92.6 PREMENSTRUAL SPOTTING: ICD-10-CM

## 2024-07-22 DIAGNOSIS — Z01.419 ENCNTR FOR GYN EXAM (GENERAL) (ROUTINE) W/O ABN FINDINGS: Primary | ICD-10-CM

## 2024-07-22 DIAGNOSIS — Z12.31 ENCOUNTER FOR SCREENING MAMMOGRAM FOR BREAST CANCER: ICD-10-CM

## 2024-07-22 DIAGNOSIS — D25.2 SUBSEROUS LEIOMYOMA OF UTERUS: ICD-10-CM

## 2024-07-22 PROCEDURE — S0612 ANNUAL GYNECOLOGICAL EXAMINA: HCPCS | Performed by: OBSTETRICS & GYNECOLOGY

## 2024-07-22 NOTE — PROGRESS NOTES
Assessment        Diagnoses and all orders for this visit:    Encntr for gyn exam (general) (routine) w/o abn findings    Subserous leiomyoma of uterus  -     US pelvis complete w transvaginal; Future    Premenstrual spotting  -     US pelvis complete w transvaginal; Future    Encounter for screening mammogram for breast cancer  -     Mammo screening bilateral w 3d & cad; Future             Plan      All questions answered.  Contraception: condoms.  Educational material distributed.  Follow up in 1 year.  Follow up as needed.  Mammogram.  Pelvic ultrasound.   PAP due in   Pelvic ultrasound was ordered due to premenstrual spotting.  Discussed differentials including endometrial polyp.  Patient with history of small fibroid.  Will call patient with results    Subjective      Rani Sadler is a 39 y.o. female who presents for annual exam.      Chief Complaint   Patient presents with    Gynecologic Exam       Stable fibroids , normal periods 8 mm,  Periods monthly, every 25 days, lasting 5 days, not as heavy  She has pink discharge prior to periods (4-5 days before)      Last Pap: 6/10/22 NILM neg HPV    HPV vaccine completed:no  Current contraception: condoms  History of abnormal Pap smear: no  History of abnormal mammogram: no  Family history of uterine or ovarian cancer: no  Family history of breast cancer: no  Family history of colon cancer: no    OB History    Para Term  AB Living   2 2 2 0 0 2   SAB IAB Ectopic Multiple Live Births   0 0 0 0 2      # Outcome Date GA Lbr Jeff/2nd Weight Sex Type Anes PTL Lv   2 Term 18 40w2d 03:58 / 00:23 3317 g (7 lb 5 oz) M Vag-Vacuum None N AARON      Name: YI,BABY BOY  (RANI)      Apgar1: 8  Apgar5: 9   1 Term 07/19/15 38w2d    Vag-Vacuum   AARON       Menstrual History:  OB History          2    Para   2    Term   2       0    AB   0    Living   2         SAB        IAB        Ectopic        Multiple   0    Live Births   2            "     Menarche age: 14  Patient's last menstrual period was 2024 (exact date).             Past Medical History:   Diagnosis Date    Allergic     - seasonal allergies    Anemia     Anxiety     Pregnancy     x2     History reviewed. No pertinent surgical history.  Family History   Problem Relation Age of Onset    Thyroid cancer Mother     Cancer Mother         Thyroid cancer    Anxiety disorder Mother     Diabetes Father     Asthma Brother     No Known Problems Maternal Grandmother     Heart disease Maternal Grandfather        Social History     Tobacco Use    Smoking status: Never    Smokeless tobacco: Never   Vaping Use    Vaping status: Never Used   Substance Use Topics    Alcohol use: Yes     Alcohol/week: 6.0 standard drinks of alcohol     Types: 3 Glasses of wine, 3 Cans of beer per week     Comment: social    Drug use: No          Current Outpatient Medications:     ferrous gluconate 324 (37.5 Fe) mg, Take 1 tablet (324 mg total) by mouth 2 (two) times a day before meals, Disp: 200 tablet, Rfl: 1    Multiple Vitamin (multivitamin) tablet, Take 1 tablet by mouth daily Pre ruperto - no iron, Disp: , Rfl:     Allergies   Allergen Reactions    No Active Allergies            Review of Systems   Constitutional: Negative.    HENT: Negative.     Eyes: Negative.    Respiratory: Negative.     Cardiovascular: Negative.    Gastrointestinal: Negative.    Endocrine: Negative.    Genitourinary:         As noted in HPI   Musculoskeletal: Negative.    Skin: Negative.    Allergic/Immunologic: Negative.    Neurological: Negative.    Hematological: Negative.    Psychiatric/Behavioral: Negative.         /78 (BP Location: Right arm, Patient Position: Sitting, Cuff Size: Adult)   Pulse 97   Ht 5' 5\" (1.651 m)   Wt 68.3 kg (150 lb 9.6 oz)   LMP 2024 (Exact Date)   SpO2 100%   BMI 25.06 kg/m²         Physical Exam  Constitutional:       Appearance: She is well-developed.   Genitourinary:      Vulva, bladder and " rectum normal.      No lesions in the vagina.      Genitourinary Comments:   nevus      Right Labia: No rash, tenderness, lesions, skin changes or Bartholin's cyst.     Left Labia: No tenderness, lesions, skin changes, Bartholin's cyst or rash.     No inguinal adenopathy present in the right or left side.     No vaginal discharge, tenderness or bleeding.      No vaginal prolapse present.     No vaginal atrophy present.       Right Adnexa: not tender, not full and no mass present.     Left Adnexa: not tender, not full and no mass present.     No cervical motion tenderness, friability, lesion or polyp.      Uterus is not enlarged or tender.      Pelvic exam was performed with patient in the lithotomy position.   Rectum:      No external hemorrhoid.   Breasts:     Right: No mass, nipple discharge, skin change or tenderness.      Left: No mass, nipple discharge, skin change or tenderness.   HENT:      Head: Normocephalic.      Nose: Nose normal.   Eyes:      Conjunctiva/sclera: Conjunctivae normal.   Neck:      Thyroid: No thyromegaly.   Cardiovascular:      Rate and Rhythm: Normal rate and regular rhythm.      Heart sounds: Normal heart sounds. No murmur heard.  Pulmonary:      Effort: Pulmonary effort is normal. No respiratory distress.      Breath sounds: Normal breath sounds. No wheezing or rales.   Chest:       Abdominal:      General: There is no distension.      Palpations: Abdomen is soft. There is no mass.      Tenderness: There is no abdominal tenderness. There is no guarding or rebound.   Musculoskeletal:         General: No tenderness.      Cervical back: Neck supple. No muscular tenderness.   Lymphadenopathy:      Cervical: No cervical adenopathy.      Lower Body: No right inguinal adenopathy. No left inguinal adenopathy.   Neurological:      Mental Status: She is alert and oriented to person, place, and time.   Skin:     General: Skin is warm and dry.   Psychiatric:         Mood and Affect: Mood normal.          Behavior: Behavior normal.   Vitals and nursing note reviewed. Exam conducted with a chaperone present.             Future Appointments   Date Time Provider Department Center   7/9/2025  1:00 PM Mara Fitzpatrick DO [facility]

## 2024-08-02 PROBLEM — Z00.00 ROUTINE ADULT HEALTH MAINTENANCE: Status: RESOLVED | Noted: 2023-07-07 | Resolved: 2024-08-02

## 2025-01-24 ENCOUNTER — TELEPHONE (OUTPATIENT)
Dept: FAMILY MEDICINE CLINIC | Facility: CLINIC | Age: 40
End: 2025-01-24

## 2025-02-28 DIAGNOSIS — D50.9 IRON DEFICIENCY ANEMIA, UNSPECIFIED IRON DEFICIENCY ANEMIA TYPE: ICD-10-CM

## 2025-03-03 RX ORDER — FERROUS GLUCONATE 324(37.5)
324 TABLET ORAL
Qty: 180 TABLET | Refills: 0 | Status: SHIPPED | OUTPATIENT
Start: 2025-03-03 | End: 2025-06-01

## 2025-03-24 ENCOUNTER — PATIENT MESSAGE (OUTPATIENT)
Dept: FAMILY MEDICINE CLINIC | Facility: CLINIC | Age: 40
End: 2025-03-24

## 2025-03-26 ENCOUNTER — OFFICE VISIT (OUTPATIENT)
Age: 40
End: 2025-03-26
Payer: COMMERCIAL

## 2025-03-26 VITALS
WEIGHT: 150 LBS | DIASTOLIC BLOOD PRESSURE: 74 MMHG | SYSTOLIC BLOOD PRESSURE: 144 MMHG | TEMPERATURE: 97.9 F | OXYGEN SATURATION: 99 % | RESPIRATION RATE: 18 BRPM | HEART RATE: 115 BPM | HEIGHT: 66 IN | BODY MASS INDEX: 24.11 KG/M2

## 2025-03-26 DIAGNOSIS — R42 DIZZINESS: Primary | ICD-10-CM

## 2025-03-26 PROCEDURE — G0382 LEV 3 HOSP TYPE B ED VISIT: HCPCS

## 2025-03-26 PROCEDURE — S9083 URGENT CARE CENTER GLOBAL: HCPCS

## 2025-03-26 PROCEDURE — 93005 ELECTROCARDIOGRAM TRACING: CPT

## 2025-03-26 NOTE — PROGRESS NOTES
"[facility]        NAME: Tamika Sadler is a 39 y.o. female  : 1985    MRN: [id number]  DATE: 2025  TIME: 6:02 PM    Assessment and Plan   Dizziness [R42]  1. Dizziness  ECG 12 lead          Patient Instructions   No signs of allergic reaction at this time.  Will have you follow-up with your PCP as well as continue to monitor your symptoms.    Follow up with Primary Care Provider.  Proceed to Emergency Department if symptoms worsen.    If tests have been performed at [facility], our office will contact you with results if changes need to be made to the care plan discussed with you at the visit.  You can review your full results on [facility].    Chief Complaint     Chief Complaint   Patient presents with   • Fatigue     Presents for immediate dizziness after eating an omelet around 3pm. Pt states she ate eggs on Saturday and felt the same way, sx include dizziness, feeling jittery, tingling and feet and hands after digestion. Current time of 5:07pm, pt states she now feels \"spacey and super tired\". Hx of low iron. Eggs are from chickens at home- denies knowledge of sick chickens but did lose one about 2 weeks ago unknowingly.  has also eaten eggs and had no issues.          History of Present Illness       Patient reports she felt dizzy after eating omelette about 2 hours prior to arrival.  She states that she ate the same eggs about 4 days ago and had similar symptoms for about 2 hours.  Headaches the last 3 days as well but did not have any symptoms.  She has been having seasonal allergies and so had taken Claritin prior to eating eggs.  She reports that she is feeling spacey and tired and does report that she had history of iron deficiency anemia.  Last time she had her levels checked was July of last year.  She denies any extra heavy menstrual periods and states her last menstrual period had in July ended about 4 days ago and so she thought her previous episode was " "related to her just having her period.    Fatigue  Associated symptoms include fatigue. Pertinent negatives include no abdominal pain, chest pain or coughing.       Review of Systems   Review of Systems   Constitutional:  Positive for fatigue.   Respiratory:  Negative for cough and shortness of breath.    Cardiovascular:  Negative for chest pain.   Gastrointestinal:  Negative for abdominal pain.   Neurological:  Positive for dizziness.         Current Medications       Current Outpatient Medications:   •  ferrous gluconate 324 (37.5 Fe) mg, Take 1 tablet (324 mg total) by mouth 2 (two) times a day before meals, Disp: 180 tablet, Rfl: 0  •  Multiple Vitamin (multivitamin) tablet, Take 1 tablet by mouth daily Pre  - no iron, Disp: , Rfl:     Current Allergies     Allergies as of 2025 - Reviewed 2025   Allergen Reaction Noted   • No active allergies     • Pollen extract Allergic Rhinitis 2025            The following portions of the patient's history were reviewed and updated as appropriate: allergies, current medications, past family history, past medical history, past social history, past surgical history and problem list.     Past Medical History:   Diagnosis Date   • Allergic     - seasonal allergies   • Anemia    • Anxiety    • Pregnancy     x2       History reviewed. No pertinent surgical history.    Family History   Problem Relation Age of Onset   • Thyroid cancer Mother    • Cancer Mother         Thyroid cancer   • Anxiety disorder Mother    • Diabetes Father    • Asthma Brother    • No Known Problems Maternal Grandmother    • Heart disease Maternal Grandfather          Medications have been verified.        Objective   /74   Pulse (!) 115   Temp 97.9 °F (36.6 °C)   Resp 18   Ht 5' 6\" (1.676 m)   Wt 68 kg (150 lb)   LMP 2025 (Approximate)   SpO2 99%   BMI 24.21 kg/m²   Patient's last menstrual period was 2025 (approximate).      Physical Exam     Physical " Exam  Vitals and nursing note reviewed.   Constitutional:       Appearance: Normal appearance.   HENT:      Head: Normocephalic and atraumatic.   Cardiovascular:      Rate and Rhythm: Normal rate.      Pulses: Normal pulses.      Heart sounds: Normal heart sounds.   Pulmonary:      Effort: Pulmonary effort is normal.      Breath sounds: Normal breath sounds.   Skin:     General: Skin is warm and dry.      Capillary Refill: Capillary refill takes less than 2 seconds.   Neurological:      General: No focal deficit present.      Mental Status: She is alert and oriented to person, place, and time. Mental status is at baseline.      GCS: GCS eye subscore is 4. GCS verbal subscore is 5. GCS motor subscore is 6.      Cranial Nerves: Cranial nerves 2-12 are intact.      Sensory: Sensation is intact. No sensory deficit.      Motor: No weakness.      Coordination: Coordination is intact.   Psychiatric:         Mood and Affect: Mood normal.         Behavior: Behavior normal.         Thought Content: Thought content normal.

## 2025-03-27 ENCOUNTER — OFFICE VISIT (OUTPATIENT)
Dept: FAMILY MEDICINE CLINIC | Facility: CLINIC | Age: 40
End: 2025-03-27
Payer: COMMERCIAL

## 2025-03-27 VITALS
TEMPERATURE: 97 F | WEIGHT: 155 LBS | SYSTOLIC BLOOD PRESSURE: 130 MMHG | HEIGHT: 66 IN | BODY MASS INDEX: 24.91 KG/M2 | HEART RATE: 98 BPM | OXYGEN SATURATION: 99 % | DIASTOLIC BLOOD PRESSURE: 84 MMHG

## 2025-03-27 DIAGNOSIS — Z86.2 HISTORY OF IRON DEFICIENCY ANEMIA: ICD-10-CM

## 2025-03-27 DIAGNOSIS — R53.83 OTHER FATIGUE: Primary | ICD-10-CM

## 2025-03-27 DIAGNOSIS — R42 DIZZINESS: ICD-10-CM

## 2025-03-27 DIAGNOSIS — Z13.29 SCREENING FOR THYROID DISORDER: ICD-10-CM

## 2025-03-27 DIAGNOSIS — E53.8 VITAMIN B12 DEFICIENCY: ICD-10-CM

## 2025-03-27 DIAGNOSIS — R00.2 PALPITATIONS: ICD-10-CM

## 2025-03-27 DIAGNOSIS — E55.9 VITAMIN D DEFICIENCY: ICD-10-CM

## 2025-03-27 LAB
ATRIAL RATE: 95 BPM
P AXIS: 77 DEGREES
PR INTERVAL: 146 MS
QRS AXIS: 57 DEGREES
QRSD INTERVAL: 76 MS
QT INTERVAL: 370 MS
QTC INTERVAL: 466 MS
T WAVE AXIS: 48 DEGREES
VENTRICULAR RATE: 95 BPM

## 2025-03-27 PROCEDURE — 93010 ELECTROCARDIOGRAM REPORT: CPT | Performed by: INTERNAL MEDICINE

## 2025-03-27 PROCEDURE — 99214 OFFICE O/P EST MOD 30 MIN: CPT | Performed by: NURSE PRACTITIONER

## 2025-03-27 PROCEDURE — 93000 ELECTROCARDIOGRAM COMPLETE: CPT | Performed by: NURSE PRACTITIONER

## 2025-03-27 NOTE — PROGRESS NOTES
Name: Tamika Sadler      : 1985      MRN: [id number]  Encounter Provider: NIXON Parkinson  Encounter Date: 3/27/2025   Encounter department: [facility]  :  Assessment & Plan  Other fatigue  Unclear cause of patient's recent symptoms  Currently asymptomatic  Likely multifactorial  Start workup  Check lab work today as below  Follow-up 2 weeks to review  Monitor for and journal any symptoms  Bring to follow-up    Orders:    Comprehensive metabolic panel; Future    TSH, 3rd generation; Future    T4, free; Future    Vitamin B12; Future    Vitamin D 25 hydroxy; Future    Iron Panel (Includes Ferritin, Iron Sat%, Iron, and TIBC); Future    CBC and differential; Future    History of iron deficiency anemia  No cause was identified for her anemia in   Did see hematology-no follow-up was required at that time  We will recheck labs today  Refer to GI for evaluation if abnormal again      Orders:    Vitamin B12; Future    Iron Panel (Includes Ferritin, Iron Sat%, Iron, and TIBC); Future    CBC and differential; Future    Ambulatory Referral to Gastroenterology; Future    Screening for thyroid disorder    Orders:    TSH, 3rd generation; Future    T4, free; Future    Vitamin B12 deficiency    Orders:    Vitamin B12; Future    Ambulatory Referral to Gastroenterology; Future    Vitamin D deficiency    Orders:    Vitamin D 25 hydroxy; Future    Ambulatory Referral to Gastroenterology; Future    Palpitations  Patient reports history of intermittent palpitations  Exact dates uncertain, but suspect when she was anemic  EKG last night at urgent care:  Normal sinus rhythm with sinus arrhythmia  Nonspecific ST and T wave abnormality  Prolonged QT  Abnormal ECG  Repeat EKG today:  Normal sinus rhythm; normal ECG  She is currently asymptomatic  Cardiac assessment: Heart rate regular; no murmur  We will check lab work as below  Discussed Holter monitor if symptoms return  Orders:    POCT  "ECG    Dizziness    Orders:    Comprehensive metabolic panel; Future    TSH, 3rd generation; Future    T4, free; Future    Vitamin B12; Future    Vitamin D 25 hydroxy; Future    Iron Panel (Includes Ferritin, Iron Sat%, Iron, and TIBC); Future    CBC and differential; Future    POCT ECG          Depression Screening and Follow-up Plan: Patient was screened for depression during today's encounter. They screened negative with a PHQ-2 score of 0.        History of Present Illness   Urgent care follow up  Presented with complaint of fatigue and dizziness, foggy in the head.  First episode happened last Saturday-3/22.  She noted after eating eggs (she normally eats these eggs without issue).  Her symptoms lasted for a few hours and slowly started to resolve, but did not completely resolve for several days.  Symptoms returned again yesterday.  This concerned her so she presented to the urgent care.  This time she had what she felt were palpitations and/or chest flutters as well.  She is presently asymptomatic.  She does have a history of iron deficient anemia-in 2022, for which she had iron infusions.  Believes she may have had similar symptoms at that time.  She did see hematology, but no real cause was identified for her abnormal labs.  She is not currently menstruating.  Her last period was on 3/17 and it was not heavy.        Urgent care record reviewed  \"Patient reports she felt dizzy after eating omelette about 2 hours prior to arrival.  She states that she ate the same eggs about 4 days ago and had similar symptoms for about 2 hours.  Headaches the last 3 days as well but did not have any symptoms.  She has been having seasonal allergies and so had taken Claritin prior to eating eggs.  She reports that she is feeling spacey and tired and does report that she had history of iron deficiency anemia.  Last time she had her levels checked was July of last year.  She denies any extra heavy menstrual periods and states " "her last menstrual period had in July ended about 4 days ago and so she thought her previous episode was related to her just having her period.     Fatigue  Associated symptoms include fatigue. Pertinent negatives include no abdominal pain, chest pain or coughing. \"        Review of Systems   Constitutional: Negative.    Respiratory: Negative.     Cardiovascular: Negative.    Gastrointestinal: Negative.    Neurological: Negative.    Psychiatric/Behavioral: Negative.     Currently asymptomatic    Objective   /84   Pulse 98   Temp (!) 97 °F (36.1 °C)   Ht 5' 6\" (1.676 m)   Wt 70.3 kg (155 lb)   LMP 03/17/2025 (Approximate)   SpO2 99%   BMI 25.02 kg/m²      Physical Exam  Vitals and nursing note reviewed.   Constitutional:       General: She is not in acute distress.     Appearance: Normal appearance. She is well-developed and well-groomed. She is not ill-appearing.   Neck:      Thyroid: No thyromegaly.   Cardiovascular:      Rate and Rhythm: Normal rate and regular rhythm.      Heart sounds: Normal heart sounds.   Pulmonary:      Effort: Pulmonary effort is normal. No respiratory distress.      Breath sounds: Normal breath sounds and air entry.   Abdominal:      General: Bowel sounds are increased.      Palpations: Abdomen is soft.      Tenderness: There is no abdominal tenderness. There is no guarding or rebound.   Skin:     General: Skin is warm and dry.   Neurological:      General: No focal deficit present.      Mental Status: She is alert and oriented to person, place, and time.   Psychiatric:         Attention and Perception: Attention normal.         Mood and Affect: Mood normal.         Behavior: Behavior normal.         Thought Content: Thought content normal.         Judgment: Judgment normal.         "

## 2025-03-29 ENCOUNTER — APPOINTMENT (OUTPATIENT)
Dept: LAB | Facility: CLINIC | Age: 40
End: 2025-03-29
Payer: COMMERCIAL

## 2025-03-29 DIAGNOSIS — R53.83 OTHER FATIGUE: ICD-10-CM

## 2025-03-29 DIAGNOSIS — E53.8 VITAMIN B12 DEFICIENCY: ICD-10-CM

## 2025-03-29 DIAGNOSIS — R42 DIZZINESS: ICD-10-CM

## 2025-03-29 DIAGNOSIS — Z86.2 HISTORY OF IRON DEFICIENCY ANEMIA: ICD-10-CM

## 2025-03-29 DIAGNOSIS — Z13.29 SCREENING FOR THYROID DISORDER: ICD-10-CM

## 2025-03-29 DIAGNOSIS — E55.9 VITAMIN D DEFICIENCY: ICD-10-CM

## 2025-03-31 ENCOUNTER — APPOINTMENT (OUTPATIENT)
Dept: LAB | Facility: CLINIC | Age: 40
End: 2025-03-31
Payer: COMMERCIAL

## 2025-03-31 LAB
25(OH)D3 SERPL-MCNC: 31.9 NG/ML (ref 30–100)
ALBUMIN SERPL BCG-MCNC: 4.8 G/DL (ref 3.5–5)
ALP SERPL-CCNC: 50 U/L (ref 34–104)
ALT SERPL W P-5'-P-CCNC: 21 U/L (ref 7–52)
ANION GAP SERPL CALCULATED.3IONS-SCNC: 10 MMOL/L (ref 4–13)
AST SERPL W P-5'-P-CCNC: 22 U/L (ref 13–39)
BASOPHILS # BLD AUTO: 0.04 THOUSANDS/ÂΜL (ref 0–0.1)
BASOPHILS NFR BLD AUTO: 1 % (ref 0–1)
BILIRUB SERPL-MCNC: 0.72 MG/DL (ref 0.2–1)
BUN SERPL-MCNC: 13 MG/DL (ref 5–25)
CALCIUM SERPL-MCNC: 9.7 MG/DL (ref 8.4–10.2)
CHLORIDE SERPL-SCNC: 102 MMOL/L (ref 96–108)
CO2 SERPL-SCNC: 25 MMOL/L (ref 21–32)
CREAT SERPL-MCNC: 1.03 MG/DL (ref 0.6–1.3)
EOSINOPHIL # BLD AUTO: 0.07 THOUSAND/ÂΜL (ref 0–0.61)
EOSINOPHIL NFR BLD AUTO: 1 % (ref 0–6)
ERYTHROCYTE [DISTWIDTH] IN BLOOD BY AUTOMATED COUNT: 12.6 % (ref 11.6–15.1)
FERRITIN SERPL-MCNC: 20 NG/ML (ref 11–307)
GFR SERPL CREATININE-BSD FRML MDRD: 68 ML/MIN/1.73SQ M
GLUCOSE SERPL-MCNC: 92 MG/DL (ref 65–140)
HCT VFR BLD AUTO: 39.3 % (ref 34.8–46.1)
HGB BLD-MCNC: 13.2 G/DL (ref 11.5–15.4)
IMM GRANULOCYTES # BLD AUTO: 0.03 THOUSAND/UL (ref 0–0.2)
IMM GRANULOCYTES NFR BLD AUTO: 0 % (ref 0–2)
IRON SATN MFR SERPL: 28 % (ref 15–50)
IRON SERPL-MCNC: 113 UG/DL (ref 50–212)
LYMPHOCYTES # BLD AUTO: 1.51 THOUSANDS/ÂΜL (ref 0.6–4.47)
LYMPHOCYTES NFR BLD AUTO: 22 % (ref 14–44)
MCH RBC QN AUTO: 30.7 PG (ref 26.8–34.3)
MCHC RBC AUTO-ENTMCNC: 33.6 G/DL (ref 31.4–37.4)
MCV RBC AUTO: 91 FL (ref 82–98)
MONOCYTES # BLD AUTO: 0.68 THOUSAND/ÂΜL (ref 0.17–1.22)
MONOCYTES NFR BLD AUTO: 10 % (ref 4–12)
NEUTROPHILS # BLD AUTO: 4.41 THOUSANDS/ÂΜL (ref 1.85–7.62)
NEUTS SEG NFR BLD AUTO: 66 % (ref 43–75)
NRBC BLD AUTO-RTO: 0 /100 WBCS
PLATELET # BLD AUTO: 297 THOUSANDS/UL (ref 149–390)
PMV BLD AUTO: 10.1 FL (ref 8.9–12.7)
POTASSIUM SERPL-SCNC: 4.1 MMOL/L (ref 3.5–5.3)
PROT SERPL-MCNC: 7.2 G/DL (ref 6.4–8.4)
RBC # BLD AUTO: 4.3 MILLION/UL (ref 3.81–5.12)
SODIUM SERPL-SCNC: 137 MMOL/L (ref 135–147)
T4 FREE SERPL-MCNC: 0.88 NG/DL (ref 0.61–1.12)
TIBC SERPL-MCNC: 399 UG/DL (ref 250–450)
TRANSFERRIN SERPL-MCNC: 285 MG/DL (ref 203–362)
TSH SERPL DL<=0.05 MIU/L-ACNC: 1.4 UIU/ML (ref 0.45–4.5)
UIBC SERPL-MCNC: 286 UG/DL (ref 155–355)
VIT B12 SERPL-MCNC: 409 PG/ML (ref 180–914)
WBC # BLD AUTO: 6.74 THOUSAND/UL (ref 4.31–10.16)

## 2025-03-31 PROCEDURE — 83540 ASSAY OF IRON: CPT

## 2025-03-31 PROCEDURE — 82306 VITAMIN D 25 HYDROXY: CPT

## 2025-03-31 PROCEDURE — 84443 ASSAY THYROID STIM HORMONE: CPT

## 2025-03-31 PROCEDURE — 82728 ASSAY OF FERRITIN: CPT

## 2025-03-31 PROCEDURE — 83550 IRON BINDING TEST: CPT

## 2025-03-31 PROCEDURE — 82607 VITAMIN B-12: CPT

## 2025-03-31 PROCEDURE — 80053 COMPREHEN METABOLIC PANEL: CPT

## 2025-03-31 PROCEDURE — 36415 COLL VENOUS BLD VENIPUNCTURE: CPT

## 2025-03-31 PROCEDURE — 85025 COMPLETE CBC W/AUTO DIFF WBC: CPT

## 2025-03-31 PROCEDURE — 84439 ASSAY OF FREE THYROXINE: CPT

## 2025-04-01 ENCOUNTER — RESULTS FOLLOW-UP (OUTPATIENT)
Dept: FAMILY MEDICINE CLINIC | Facility: CLINIC | Age: 40
End: 2025-04-01

## 2025-04-02 ENCOUNTER — RESULTS FOLLOW-UP (OUTPATIENT)
Dept: FAMILY MEDICINE CLINIC | Facility: CLINIC | Age: 40
End: 2025-04-02

## 2025-04-04 ENCOUNTER — PATIENT MESSAGE (OUTPATIENT)
Dept: FAMILY MEDICINE CLINIC | Facility: CLINIC | Age: 40
End: 2025-04-04

## 2025-04-09 ENCOUNTER — OFFICE VISIT (OUTPATIENT)
Dept: FAMILY MEDICINE CLINIC | Facility: CLINIC | Age: 40
End: 2025-04-09
Payer: COMMERCIAL

## 2025-04-09 VITALS
WEIGHT: 151 LBS | HEART RATE: 103 BPM | SYSTOLIC BLOOD PRESSURE: 112 MMHG | TEMPERATURE: 97.1 F | DIASTOLIC BLOOD PRESSURE: 80 MMHG | OXYGEN SATURATION: 99 % | BODY MASS INDEX: 24.27 KG/M2 | HEIGHT: 66 IN

## 2025-04-09 DIAGNOSIS — R53.83 OTHER FATIGUE: ICD-10-CM

## 2025-04-09 DIAGNOSIS — R00.2 PALPITATIONS: ICD-10-CM

## 2025-04-09 DIAGNOSIS — R42 DIZZINESS: Primary | ICD-10-CM

## 2025-04-09 DIAGNOSIS — R41.840 ATTENTION OR CONCENTRATION DEFICIT: ICD-10-CM

## 2025-04-09 DIAGNOSIS — R29.898 WEAKNESS OF BOTH LOWER EXTREMITIES: ICD-10-CM

## 2025-04-09 DIAGNOSIS — R07.89 FEELING OF CHEST TIGHTNESS: ICD-10-CM

## 2025-04-09 PROCEDURE — 99214 OFFICE O/P EST MOD 30 MIN: CPT | Performed by: NURSE PRACTITIONER

## 2025-04-09 NOTE — PROGRESS NOTES
"Name: Tamika Sadler      : 1985      MRN: [id number]  Encounter Provider: NIXON Parkinson  Encounter Date: 2025   Encounter department: [facility]  :  Assessment & Plan  Weakness of both lower extremities  Unclear cause of symptoms  No back pain, urinary symptoms or other neurological symptoms  Check additional labs today as below  Referral to Neurology     Orders:    Magnesium; Future    CK; Future    C-reactive protein; Future    Ambulatory Referral to Neurology; Future    Other fatigue    Orders:    Ambulatory Referral to Neurology; Future    Dizziness  Unclear cause of pt recurrent symptoms  Physical assessment remains unremarkable  Orthostatic BP WNL  Laying  Sitting  Standing  EKG normal 3/27  Will check Holter - 48 hr ordered  Will check additional labs: Mg, CK, CRP  Will refer to cardiology  Strict ER precautions with further, acute episode    Orders:    Ambulatory Referral to Cardiology; Future    Ambulatory Referral to Neurology; Future    Palpitations  Unclear cause of pt recurrent symptoms  Physical assessment remains unremarkable  Orthostatic BP WNL  Laying - 124/80  Sitting - 140/80  Standing - 134/80  EKG normal 3/27  Will check Holter - 48 hr ordered  Will check additional labs: Mg, CK, CRP  Will refer to cardiology  Strict ER precautions with further, acute episode    Orders:    Holter monitor; Future    Ambulatory Referral to Cardiology; Future    Feeling of chest tightness    Orders:    Ambulatory Referral to Cardiology; Future    Attention or concentration deficit    Orders:    Ambulatory referral to Psych Services; Future           History of Present Illness   Follow up  Presents with ongoing dizzy symptoms. Now with associated leg weakness. Episode occurred Monday. Reports getting dizzy while shopping at the grocery store - noted while bending down. Felt \"whoozy\" but no other neurologic symptoms. Left the store and went back to work. Attempted to " "walk down the stairs at work and her legs suddenly felt weak and shaky, causing her to have to sit. Was able to get up after a few minutes. Legs have been \"weak\" since. She describes her legs as feeling shaky when standing for more than a few minutes. No leg pain. No back pain. No numbness or tingling. Still with intermittent palpitations and chest tightness since her last visit. Unable to give specific timeframe.   She does admit to feeling like this in the past when she was iron deficient - but not to this extreme, nor with the dizziness or leg weakness.  She completed her lab work on the 31st- admits to taking an extra iron tablet for 3 days  prior to her labs. In addition to her normal dose of twice daily.  It was initially assumed that her prior symptoms were food allergy driven. She reports she has eaten the same eggs since and it has not elicited any symptoms at that time.  Pt additionally reports random \"fainting\" episodes as a child and into teen years. Reports 2-3 times since adulthood, but have been triggered by blood draws.   She reports her son is currently being tested for ADHD and when asked the questions, she found her self answering positive to many of them as well - she is now thinking of getting tested.   She reports elevated anxiety - but nothing our of the ordinary that could be causing her recent symptoms.     She is currently feeling weak and dizzy while in the office today. Reports the dizziness just started since she got here. She did eat this am - reports eating left over pizza from last night. Reports drinking 8-16 ounces of water this am.       Review of Systems   Constitutional:  Positive for activity change, appetite change and fatigue.   HENT: Negative.     Respiratory: Negative.     Cardiovascular:  Positive for chest pain and palpitations.   Gastrointestinal: Negative.    Genitourinary: Negative.    Musculoskeletal:  Positive for myalgias.   Neurological:  Positive for dizziness and " "weakness. Negative for numbness.   Psychiatric/Behavioral: Negative.         Objective   /80   Pulse 103   Temp (!) 97.1 °F (36.2 °C)   Ht 5' 6\" (1.676 m)   Wt 68.5 kg (151 lb)   LMP 03/17/2025 (Approximate)   SpO2 99%   BMI 24.37 kg/m²      Physical Exam  Vitals and nursing note reviewed.   Constitutional:       General: She is not in acute distress.     Appearance: Normal appearance. She is well-developed. She is not ill-appearing.   Cardiovascular:      Rate and Rhythm: Normal rate and regular rhythm.      Heart sounds: Normal heart sounds.   Pulmonary:      Effort: Pulmonary effort is normal. No respiratory distress.      Breath sounds: Normal breath sounds and air entry.   Musculoskeletal:      Right lower leg: No edema.      Left lower leg: No edema.   Skin:     General: Skin is warm and dry.   Neurological:      General: No focal deficit present.      Mental Status: She is alert and oriented to person, place, and time.      Motor: Motor function is intact.      Gait: Gait is intact.      Comments: Lower extremity motor and sensation intact  Monofilament intact b/l feet   Psychiatric:         Attention and Perception: Attention normal.         Mood and Affect: Mood normal.         Behavior: Behavior normal.         "

## 2025-04-14 ENCOUNTER — APPOINTMENT (OUTPATIENT)
Dept: LAB | Facility: CLINIC | Age: 40
End: 2025-04-14
Payer: COMMERCIAL

## 2025-04-14 DIAGNOSIS — R29.898 WEAKNESS OF BOTH LOWER EXTREMITIES: ICD-10-CM

## 2025-04-15 ENCOUNTER — APPOINTMENT (OUTPATIENT)
Dept: LAB | Facility: CLINIC | Age: 40
End: 2025-04-15
Payer: COMMERCIAL

## 2025-04-15 LAB
CK SERPL-CCNC: 56 U/L (ref 26–192)
CRP SERPL QL: <1 MG/L
MAGNESIUM SERPL-MCNC: 2.2 MG/DL (ref 1.9–2.7)

## 2025-04-15 PROCEDURE — 83735 ASSAY OF MAGNESIUM: CPT

## 2025-04-15 PROCEDURE — 86140 C-REACTIVE PROTEIN: CPT

## 2025-04-15 PROCEDURE — 36415 COLL VENOUS BLD VENIPUNCTURE: CPT

## 2025-04-15 PROCEDURE — 82550 ASSAY OF CK (CPK): CPT

## 2025-04-17 ENCOUNTER — RESULTS FOLLOW-UP (OUTPATIENT)
Dept: FAMILY MEDICINE CLINIC | Facility: CLINIC | Age: 40
End: 2025-04-17

## 2025-04-17 ENCOUNTER — HOSPITAL ENCOUNTER (OUTPATIENT)
Dept: NON INVASIVE DIAGNOSTICS | Facility: HOSPITAL | Age: 40
Discharge: HOME | End: 2025-04-17
Attending: NURSE PRACTITIONER
Payer: COMMERCIAL

## 2025-04-17 DIAGNOSIS — R00.2 PALPITATIONS: ICD-10-CM

## 2025-04-17 PROCEDURE — 93226 XTRNL ECG REC<48 HR SCAN A/R: CPT

## 2025-04-17 PROCEDURE — 93225 XTRNL ECG REC<48 HRS REC: CPT

## 2025-04-24 ENCOUNTER — TELEPHONE (OUTPATIENT)
Age: 40
End: 2025-04-24

## 2025-04-24 NOTE — TELEPHONE ENCOUNTER
Contacted Pt. in regards to ROUTINE Referral, LVM to contact [phone] to discuss services needed at this time in order to be added to proper wait list.

## 2025-05-05 DIAGNOSIS — D50.9 IRON DEFICIENCY ANEMIA, UNSPECIFIED IRON DEFICIENCY ANEMIA TYPE: ICD-10-CM

## 2025-05-06 RX ORDER — FERROUS GLUCONATE 324(37.5)
324 TABLET ORAL
Qty: 180 TABLET | Refills: 1 | Status: SHIPPED | OUTPATIENT
Start: 2025-05-06 | End: 2025-11-02

## 2025-05-08 ENCOUNTER — TELEPHONE (OUTPATIENT)
Dept: FAMILY MEDICINE CLINIC | Facility: CLINIC | Age: 40
End: 2025-05-08

## 2025-05-09 ENCOUNTER — OFFICE VISIT (OUTPATIENT)
Dept: FAMILY MEDICINE CLINIC | Facility: CLINIC | Age: 40
End: 2025-05-09
Payer: COMMERCIAL

## 2025-05-09 VITALS
OXYGEN SATURATION: 97 % | BODY MASS INDEX: 24.94 KG/M2 | HEART RATE: 111 BPM | TEMPERATURE: 97.5 F | SYSTOLIC BLOOD PRESSURE: 112 MMHG | HEIGHT: 66 IN | WEIGHT: 155.2 LBS | DIASTOLIC BLOOD PRESSURE: 72 MMHG

## 2025-05-09 DIAGNOSIS — F41.9 ANXIETY: Primary | ICD-10-CM

## 2025-05-09 DIAGNOSIS — F40.243 ANXIETY WITH FLYING: ICD-10-CM

## 2025-05-09 DIAGNOSIS — Z12.31 ENCOUNTER FOR SCREENING MAMMOGRAM FOR BREAST CANCER: ICD-10-CM

## 2025-05-09 DIAGNOSIS — Z23 ENCOUNTER FOR IMMUNIZATION: ICD-10-CM

## 2025-05-09 PROCEDURE — 90715 TDAP VACCINE 7 YRS/> IM: CPT

## 2025-05-09 PROCEDURE — 90471 IMMUNIZATION ADMIN: CPT

## 2025-05-09 PROCEDURE — 99214 OFFICE O/P EST MOD 30 MIN: CPT | Performed by: NURSE PRACTITIONER

## 2025-05-09 RX ORDER — ALPRAZOLAM 0.25 MG
TABLET ORAL
Qty: 9 TABLET | Refills: 0 | Status: SHIPPED | OUTPATIENT
Start: 2025-05-09

## 2025-05-09 NOTE — PROGRESS NOTES
"Name: Tamika Sadler      : 1985      MRN: [id number]  Encounter Provider: NIXON Parkinson  Encounter Date: 2025   Encounter department: [facility]  :  Assessment & Plan  Encounter for screening mammogram for breast cancer    Orders:    Mammo screening bilateral w 3d and cad; Future    Encounter for immunization    Orders:    TDAP VACCINE GREATER THAN OR EQUAL TO 8YO IM    Anxiety with flying  Rx today for small dose Xanax for flight  PM ED without red flags    Orders:    ALPRAZolam (XANAX) 0.25 mg tablet; Take one tab one hour prior to flight. May take second tab during flight if needed. No more than 0.5 mg every 6 hours    Anxiety  Patient is attributing her prior symptoms to anxiety  She has been symptom-free for the last few weeks  She will continue with her therapist  She declines the need for any daily medication at this time  She will follow-up with any problems or concerns              History of Present Illness   Follow up  Patient was seen beginning of April with several, varying symptoms from weakness in her lower extremities, dizziness and palpitations.  Workup completed with labs, EKG and Holter monitor (all unremarkable).  She presents today for follow-up.  Reports she has been feeling well.  Her symptoms have resolved-being asymptomatic for the last several weeks.  She has started with a therapist, and now feels her symptoms were anxiety related.  She believes she was experiencing panic attacks.  She will be following with her therapist biweekly- [place] with [facility].  She reports she has been working on relaxation techniques and things to decrease her anxiety.  Her and her therapist are developing \"grounding techniques\".  She does not think she needs any daily medication for her anxiety at this time.  Her and her therapist are also evaluating for possible ADHD, which they feel may have been contributing to her anxiety/panic attacks.  She does have " "appointment scheduled with both neurology and cardiology, which she feels she will likely cancel-as she has been asymptomatic for several weeks.  She would like to discuss medication for an upcoming flight.  She has not flown in about 10 years, and is having significant anxiety to same.  She will be traveling with a friend to [place] for a wedding.  She has one 5-hour flight with a layover and then a second hour flight both there and back.        Review of Systems   Constitutional:  Negative for activity change, appetite change and fatigue.   Respiratory: Negative.     Cardiovascular: Negative.  Negative for palpitations.   Gastrointestinal: Negative.    Neurological:  Negative for dizziness, syncope, weakness and headaches.   Psychiatric/Behavioral: Negative.  Negative for dysphoric mood, self-injury, sleep disturbance and suicidal ideas. The patient is not nervous/anxious.        Objective   /72 (BP Location: Left arm, Patient Position: Sitting, Cuff Size: Adult)   Pulse (!) 111   Temp 97.5 °F (36.4 °C) (Temporal)   Ht 5' 6\" (1.676 m)   Wt 70.4 kg (155 lb 3.2 oz)   SpO2 97%   BMI 25.05 kg/m²      Physical Exam  Vitals and nursing note reviewed.   Constitutional:       General: She is not in acute distress.     Appearance: Normal appearance. She is well-developed. She is not ill-appearing.   Pulmonary:      Effort: Pulmonary effort is normal. No respiratory distress.   Neurological:      Mental Status: She is alert and oriented to person, place, and time.   Psychiatric:         Attention and Perception: Attention normal.         Mood and Affect: Mood normal.         Behavior: Behavior normal.         "

## 2025-05-22 ENCOUNTER — OFFICE VISIT (OUTPATIENT)
Dept: GASTROENTEROLOGY | Facility: MEDICAL CENTER | Age: 40
End: 2025-05-22

## 2025-05-22 VITALS
HEART RATE: 90 BPM | TEMPERATURE: 96.7 F | DIASTOLIC BLOOD PRESSURE: 86 MMHG | SYSTOLIC BLOOD PRESSURE: 133 MMHG | OXYGEN SATURATION: 99 % | HEIGHT: 66 IN | BODY MASS INDEX: 24.78 KG/M2 | WEIGHT: 154.2 LBS

## 2025-05-22 DIAGNOSIS — D50.9 IRON DEFICIENCY ANEMIA, UNSPECIFIED IRON DEFICIENCY ANEMIA TYPE: Primary | ICD-10-CM

## 2025-05-22 NOTE — ASSESSMENT & PLAN NOTE
Diagnosed in 2022. Improved with oral iron. Had menorrhagia at the time of diagnosis, now improved.  Discussed EGD and colonoscopy to r/o GI etiology, the patient will call if she would like to proceed with these tests

## 2025-05-22 NOTE — PROGRESS NOTES
"Name: Tamika Sadler      : 1985      MRN: [id number]  Encounter Provider: Marlene Caldwell PA-C  Encounter Date: 2025   Encounter department: [facility]  :  Assessment & Plan  Iron deficiency anemia, unspecified iron deficiency anemia type  Diagnosed in . Improved with oral iron. Had menorrhagia at the time of diagnosis, now improved.  Discussed EGD and colonoscopy to r/o GI etiology, the patient will call if she would like to proceed with these tests           History of Present Illness   Tamika Sadler is a 40 y.o. female who presents for evaluation of iron deficiency anemia.  The patient was diagnosed with iron deficiency anemia back in .  She has been on oral iron supplementation since then with normal hemoglobin and iron panel.  When first diagnosed she was having menorrhagia, this has now improved.  She was seen by hematology oncology who recommended as needed follow-up.  The patient does mention that when she made the appointment she was experiencing dizziness which she thought was brought on by eating.  This is now resolved.  She has also had 2 bouts of multiple loose stools but denies any diarrhea.  No other GI symptoms or complaints.  No family history of colorectal cancer.      Review of Systems A complete review of systems is negative other than that noted above in the HPI.      Current Medications[1]  Objective   /86 (BP Location: Left arm, Patient Position: Sitting, Cuff Size: Standard)   Pulse 90   Temp (!) 96.7 °F (35.9 °C) (Tympanic)   Ht 5' 6\" (1.676 m)   Wt 69.9 kg (154 lb 3.2 oz)   SpO2 99%   BMI 24.89 kg/m²       Physical Exam  Vitals and nursing note reviewed.   Constitutional:       General: She is not in acute distress.     Appearance: She is well-developed.   HENT:      Head: Normocephalic and atraumatic.     Eyes:      Conjunctiva/sclera: Conjunctivae normal.       Cardiovascular:      Rate and Rhythm: Normal " rate and regular rhythm.      Heart sounds: No murmur heard.  Pulmonary:      Effort: Pulmonary effort is normal. No respiratory distress.      Breath sounds: Normal breath sounds.     Musculoskeletal:         General: No swelling.      Cervical back: Neck supple.     Skin:     General: Skin is warm and dry.      Capillary Refill: Capillary refill takes less than 2 seconds.     Neurological:      Mental Status: She is alert.     Psychiatric:         Mood and Affect: Mood normal.          Lab Results: I personally reviewed relevant lab results.              [1]   Current Outpatient Medications   Medication Sig Dispense Refill    ALPRAZolam (XANAX) 0.25 mg tablet Take one tab one hour prior to flight. May take second tab during flight if needed. No more than 0.5 mg every 6 hours 9 tablet 0    ferrous gluconate 324 (37.5 Fe) mg Take 1 tablet (324 mg total) by mouth 2 (two) times a day before meals 180 tablet 1    Multiple Vitamin (multivitamin) tablet Take 1 tablet by mouth in the morning. Pre ruperto - no iron .       No current facility-administered medications for this visit.

## 2025-06-16 ENCOUNTER — TELEPHONE (OUTPATIENT)
Dept: OBGYN CLINIC | Facility: CLINIC | Age: 40
End: 2025-06-16